# Patient Record
Sex: FEMALE | Race: WHITE | NOT HISPANIC OR LATINO | Employment: FULL TIME | URBAN - METROPOLITAN AREA
[De-identification: names, ages, dates, MRNs, and addresses within clinical notes are randomized per-mention and may not be internally consistent; named-entity substitution may affect disease eponyms.]

---

## 2017-01-03 DIAGNOSIS — Z94.4 LIVER REPLACED BY TRANSPLANT: Primary | ICD-10-CM

## 2017-01-03 LAB
EXT ALBUMIN: 3.6
EXT ALKALINE PHOSPHATASE: 200
EXT ALT: 101
EXT AST: 100
EXT BASOPHIL%: 0.3
EXT BILIRUBIN TOTAL: 0.5
EXT BUN: 18.71
EXT CALCIUM: 8.87
EXT CHLORIDE: 109
EXT CO2: 26
EXT CREATININE: 1.27 MG/DL
EXT EOSINOPHIL%: 3
EXT GFR MDRD NON AF AMER: 45
EXT GLUCOSE: 70
EXT HEMATOCRIT: 32.7
EXT HEMOGLOBIN: 10.7
EXT LYMPH%: 13.1
EXT MONOCYTES%: 14.1
EXT PLATELETS: 211
EXT POTASSIUM: 4.97
EXT PROTEIN TOTAL: 6.8
EXT SEGS%: 69.5
EXT SODIUM: 143 MMOL/L
EXT TACROLIMUS LVL: 5.9
EXT WBC: 3.8

## 2017-01-03 RX ORDER — TACROLIMUS 1 MG/1
2 CAPSULE ORAL EVERY 12 HOURS
Qty: 120 CAPSULE | Refills: 11 | Status: SHIPPED | OUTPATIENT
Start: 2017-01-03 | End: 2017-07-07 | Stop reason: SDUPTHER

## 2017-01-03 NOTE — TELEPHONE ENCOUNTER
Labs reviewed by Dr. Claros  Increase Prograf to 2 mg bid and repeat labs on 01/16/17  Called patient to let her know  Repeated and verbalized understanding.

## 2017-01-26 LAB
EXT ALBUMIN: 3.9
EXT ALKALINE PHOSPHATASE: 129
EXT ALT: 77
EXT AST: 81
EXT BASOPHIL%: 0.7
EXT BILIRUBIN TOTAL: 0.9
EXT BUN: 17.18
EXT CALCIUM: 8.41
EXT CHLORIDE: 108
EXT CO2: 24
EXT CREATININE: 1.32 MG/DL
EXT EOSINOPHIL%: 2.6
EXT GFR MDRD NON AF AMER: 43
EXT GLUCOSE: 75
EXT HEMATOCRIT: 35.4
EXT HEMOGLOBIN: 11.7
EXT LYMPH%: 11.1
EXT MONOCYTES%: 13.9
EXT PLATELETS: 203
EXT POTASSIUM: 3.95
EXT PROTEIN TOTAL: 7.2
EXT SEGS%: 71.7
EXT SODIUM: 142 MMOL/L
EXT TACROLIMUS LVL: 6.31
EXT WBC: 4.3

## 2017-01-31 ENCOUNTER — TELEPHONE (OUTPATIENT)
Dept: TRANSPLANT | Facility: CLINIC | Age: 52
End: 2017-01-31

## 2017-01-31 NOTE — TELEPHONE ENCOUNTER
----- Message from Tay Claros MD sent at 1/30/2017 10:59 AM CST -----  Results reviewed. Please advise labs are stable.

## 2017-01-31 NOTE — TELEPHONE ENCOUNTER
Labs reviewed by Dr. Claros  Continue routine labs no changes needed.  Letter sent for next lab appointment 04/17/17

## 2017-04-27 LAB
EXT ALBUMIN: 3.8
EXT ALKALINE PHOSPHATASE: 145
EXT ALT: 101
EXT AST: 95
EXT BASOPHIL%: 0.7
EXT BILIRUBIN TOTAL: 0.6
EXT BUN: 18.83
EXT CALCIUM: 8.58
EXT CHLORIDE: 110
EXT CO2: 25
EXT CREATININE: 1.25 MG/DL
EXT EOSINOPHIL%: 3
EXT GFR MDRD NON AF AMER: 46
EXT GLUCOSE: 82
EXT HEMATOCRIT: 32.7
EXT HEMOGLOBIN: 10.9
EXT LYMPH%: 13.7
EXT MONOCYTES%: 15.4
EXT PLATELETS: 207
EXT POTASSIUM: 4.74
EXT PROTEIN TOTAL: 7.2
EXT SEGS%: 67.2
EXT SODIUM: 143 MMOL/L
EXT TACROLIMUS LVL: 8.71
EXT WBC: 3.7

## 2017-04-28 ENCOUNTER — TELEPHONE (OUTPATIENT)
Dept: TRANSPLANT | Facility: CLINIC | Age: 52
End: 2017-04-28

## 2017-04-28 NOTE — TELEPHONE ENCOUNTER
Labs reviewed by Dr. Claros  Continue routine labs no changes needed.  Letter sent for next lab appointment 07/17/17

## 2017-04-28 NOTE — TELEPHONE ENCOUNTER
----- Message from Tay Claros MD sent at 4/28/2017  8:46 AM CDT -----  Results reviewed. Please advise labs are stable.

## 2017-05-02 RX ORDER — MYCOPHENOLIC ACID 180 MG/1
TABLET, DELAYED RELEASE ORAL
Qty: 120 TABLET | Refills: 11 | Status: SHIPPED | OUTPATIENT
Start: 2017-05-02 | End: 2018-04-03 | Stop reason: SDUPTHER

## 2017-06-14 ENCOUNTER — TELEPHONE (OUTPATIENT)
Dept: TRANSPLANT | Facility: CLINIC | Age: 52
End: 2017-06-14

## 2017-06-14 NOTE — TELEPHONE ENCOUNTER
----- Message from Laurie Boogie MA sent at 6/14/2017  3:56 PM CDT -----  Contact: pt  Pt would like to know do she need any scans prior to seeing the doctor  ----- Message -----  From: Liset Hackett  Sent: 6/14/2017   3:46 PM  To: [facility] Post-Liver Transplant Non-Clinical    Patient calling to reschedule her appts from June 6th patient requesting no mondays. Please call

## 2017-07-06 ENCOUNTER — OFFICE VISIT (OUTPATIENT)
Dept: TRANSPLANT | Facility: CLINIC | Age: 52
End: 2017-07-06
Payer: COMMERCIAL

## 2017-07-06 VITALS
HEART RATE: 73 BPM | OXYGEN SATURATION: 100 % | WEIGHT: 111.56 LBS | DIASTOLIC BLOOD PRESSURE: 93 MMHG | TEMPERATURE: 98 F | SYSTOLIC BLOOD PRESSURE: 157 MMHG | RESPIRATION RATE: 17 BRPM | HEIGHT: 60 IN | BODY MASS INDEX: 21.9 KG/M2

## 2017-07-06 DIAGNOSIS — Z29.89 PROPHYLACTIC IMMUNOTHERAPY: Chronic | ICD-10-CM

## 2017-07-06 DIAGNOSIS — Z94.4 LIVER TRANSPLANTED: Primary | ICD-10-CM

## 2017-07-06 DIAGNOSIS — Z85.05 HISTORY OF MALIGNANT HEPATOMA: Chronic | ICD-10-CM

## 2017-07-06 LAB
EXT ALBUMIN: 4.1
EXT ALKALINE PHOSPHATASE: 137
EXT ALT: 86
EXT AST: 83
EXT BASOPHIL%: 0.5
EXT BILIRUBIN TOTAL: 0.8
EXT BUN: 15.83
EXT CALCIUM: 9.11
EXT CHLORIDE: 106
EXT CO2: 28
EXT CREATININE: 1.47 MG/DL
EXT EOSINOPHIL%: 1.8
EXT GFR MDRD NON AF AMER: 38
EXT GLUCOSE: 78
EXT HEMATOCRIT: 35.7
EXT HEMOGLOBIN: 11.3
EXT LYMPH%: 9.8
EXT MONOCYTES%: 16.4
EXT PLATELETS: 216
EXT POTASSIUM: 4.61
EXT PROTEIN TOTAL: 7.4
EXT SEGS%: 71.2
EXT SODIUM: 143 MMOL/L
EXT TACROLIMUS LVL: 11.03
EXT WBC: 3.96

## 2017-07-06 PROCEDURE — 99999 PR PBB SHADOW E&M-EST. PATIENT-LVL III: CPT | Mod: PBBFAC,,, | Performed by: NURSE PRACTITIONER

## 2017-07-06 PROCEDURE — 99213 OFFICE O/P EST LOW 20 MIN: CPT | Mod: S$GLB,,, | Performed by: NURSE PRACTITIONER

## 2017-07-06 NOTE — PROGRESS NOTES
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    ORGAN: LIVER  Whole or Partial: whole liver  Donor Type:  - brain death  CDC High Risk: no  Donor CMV Status: positive  Donor HCV Status: negative  Donor HBcAb: negative  Biliary Anastomosis:   Arterial Anatomy:   IVC reconstruction:   Portal vein status:     She has had the following complications since transplant: de rajesh autoimmune hepatitis. The noted complications is well controlled.    Subjective:     Interval History: Ngoc was last seen on 16. Currently, she is doing well. Current complaints include none.    Elevated LFTs are stable, recent liver biopsy on 16 reviewed in path and found to have minimal rejection  Maintained on tacro + myfortic  prograf trough 11 with increased creatinine of 1.4 from a baseline of 1.2.     No c/o jaundice, dark urine, abdominal distension, edema    Additional hx of Raynauds, RLQ bulge, found to be a seroma,  intermittently tender.     Liver biopsy 16  Not diagnostic of acute cellular rejection.  - Mild sinusoidal dilatation.  - No significant steatosis.  - No significant fibrosis seen on routine H&E sections.  - Results of iron stain and trichrome to be issued in an addendum.  - No significant plasma cell population.    Review of Systems   Constitutional: Negative for activity change, appetite change, chills, diaphoresis, fatigue, fever and unexpected weight change.   HENT: Negative for facial swelling and nosebleeds.    Respiratory: Negative for cough, chest tightness and shortness of breath.    Cardiovascular: Negative for chest pain, palpitations and leg swelling.   Gastrointestinal: Negative for abdominal distention, abdominal pain, blood in stool, constipation, diarrhea, nausea and vomiting.    Musculoskeletal: Negative for neck pain and neck stiffness.   Skin: Negative for color change, pallor and rash.   Neurological: Negative for dizziness, tremors, syncope, weakness, light-headedness and headaches.   Hematological: Negative for adenopathy. Does not bruise/bleed easily.   Psychiatric/Behavioral: Negative for agitation, behavioral problems, confusion and decreased concentration.       Objective:     Physical Exam   Constitutional: She is oriented to person, place, and time. She appears well-developed and well-nourished. No distress.   HENT:   Head: Normocephalic and atraumatic.   Mouth/Throat: No oropharyngeal exudate.   Eyes: Conjunctivae are normal. No scleral icterus.   Neck: Normal range of motion. Neck supple.   Cardiovascular: Normal rate.    No murmur heard.  Pulmonary/Chest: Effort normal.   Abdominal: Soft. She exhibits no distension.   Musculoskeletal: Normal range of motion.   Neurological: She is alert and oriented to person, place, and time. Coordination normal.   Skin: Skin is dry. No rash noted. She is not diaphoretic. No erythema. No pallor.        Surgical incision healed well, no hernia appreciated   Psychiatric: She has a normal mood and affect. Her behavior is normal. Judgment and thought content normal.     Lab Results   Component Value Date    BILITOT 0.8 11/24/2015     (H) 11/24/2015    ALT 79 (H) 11/24/2015    ALKPHOS 236 (H) 11/24/2015    CREATININE 1.2 11/24/2015    ALBUMIN 4.0 11/24/2015     Lab Results   Component Value Date    WBC 4.50 11/23/2016    HGB 12.2 11/23/2016    HCT 38.4 11/23/2016     11/23/2016     Lab Results   Component Value Date    TACROLIMUS 7.3 03/25/2013       Assessment/Plan:     1. Liver transplanted    2. Prophylactic immunotherapy    3. History of malignant hepatoma        HCC surveillance: per protocol  S/P liver transplant due to cryptogenic cirrhosis : LFTs elevated but relatively stable. Continue monitoring symptoms, labs and drug  levels for drug-related toxicity and side effects  -continue with post transplant labs per protocol  IS: decrease tacro to 2/1, repeat labs in 1-2 weeks.   Maintenance:  -Instructed to f/u with PCP for regular health maintenance care including cancer screenings and BMD  -Reviewed need to avoid sun exposure with use of sunblock, hats, long sleeves related to increased risk of skin cancers  -Recommend f/u with Dermatology for annual skin checks    RTC 1 year    Florecita Anderson NP           UNOS Patient Status  Functional Status: 100% - Normal, no complaints, no evidence of disease  Physical Capacity: No Limitations

## 2017-07-06 NOTE — LETTER
July 6, 2017        Deandre Morton Jr.  [address]  [place] [address]  Phone: [phone]  Fax: [phone]             [facility] Liver Transplant  [address]  [facility] [address]  Phone: [phone]   Patient: Ngoc Cruz   MR Number: [id number]   YOB: 1965   Date of Visit: 7/6/2017       Dear Dr. Deandre Morton Jr.    Thank you for referring Ngoc Cruz to me for evaluation. Attached you will find relevant portions of my assessment and plan of care.    If you have questions, please do not hesitate to call me. I look forward to following Ngoc Cruz along with you.    Sincerely,    Florecita Anderson, JUDY    Enclosure    If you would like to receive this communication electronically, please contact [email or web address] or [phone] to request [facility] [employer] access.    [facility] is a tool which provides read-only access to select patient information with whom you have a relationship. Its easy to use and provides real time access to review your patients record including encounter summaries, notes, results, and demographic information.    If you feel you have received this communication in error or would no longer like to receive these types of communications, please e-mail [email or web address]

## 2017-07-06 NOTE — Clinical Note
Instructed to decrease tacro to 2/1, repeat labs in 1-2 weeks rtc 1 year with Dr. Claros, if not available ok to see me

## 2017-07-07 DIAGNOSIS — Z94.4 LIVER REPLACED BY TRANSPLANT: ICD-10-CM

## 2017-07-07 RX ORDER — TACROLIMUS 1 MG/1
CAPSULE ORAL
Qty: 90 CAPSULE | Refills: 11 | Status: SHIPPED | OUTPATIENT
Start: 2017-07-07 | End: 2018-04-03 | Stop reason: SDUPTHER

## 2017-07-07 NOTE — TELEPHONE ENCOUNTER
----- Message from Florecita Anderson NP sent at 7/6/2017  3:26 PM CDT -----  Instructed to decrease tacro to 2/1, repeat labs in 1-2 weeks  rtc 1 year with Dr. Claros, if not available ok to see me

## 2017-07-26 ENCOUNTER — TELEPHONE (OUTPATIENT)
Dept: TRANSPLANT | Facility: CLINIC | Age: 52
End: 2017-07-26

## 2017-07-26 LAB
EXT ALBUMIN: 4
EXT ALKALINE PHOSPHATASE: 137
EXT ALT: 76
EXT AST: 82
EXT BASOPHIL%: 0.2
EXT BILIRUBIN TOTAL: 0.7
EXT BUN: 22.18
EXT CALCIUM: 8.41
EXT CHLORIDE: 109
EXT CO2: 24
EXT CREATININE: 1.23 MG/DL
EXT EOSINOPHIL%: 1.7
EXT GFR MDRD NON AF AMER: 46
EXT GLUCOSE: 70
EXT HEMATOCRIT: 34.5
EXT HEMOGLOBIN: 11.1
EXT LYMPH%: 9.6
EXT MONOCYTES%: 10.7
EXT PLATELETS: 219
EXT POTASSIUM: 4.3
EXT PROTEIN TOTAL: 7.3
EXT SEGS%: 77.4
EXT SODIUM: 142 MMOL/L
EXT TACROLIMUS LVL: 7.09
EXT WBC: 5.43

## 2017-07-26 NOTE — TELEPHONE ENCOUNTER
Labs reviewed by Dr. Claros  Continue routine labs no changes needed.  Letter sent for next lab appointment 09/18/17

## 2017-07-26 NOTE — TELEPHONE ENCOUNTER
----- Message from Tay Claros MD sent at 7/26/2017  9:07 AM CDT -----  Results reviewed. Please advise labs are stable.

## 2017-09-20 NOTE — TELEPHONE ENCOUNTER
----- Message from Gita Pang sent at 9/20/2017  9:55 AM CDT -----  Contact: pt  Need refills for ranitidine (ZANTAC) 300 MG tablet called in please can call her if needed @ # [phone].

## 2017-09-20 NOTE — TELEPHONE ENCOUNTER
Returned call will need to get knew lab, since her current lab does not take her insurance anymore.  Will send over a new script for Zantac.

## 2017-09-28 ENCOUNTER — TELEPHONE (OUTPATIENT)
Dept: TRANSPLANT | Facility: CLINIC | Age: 52
End: 2017-09-28

## 2017-09-28 NOTE — TELEPHONE ENCOUNTER
----- Message from Smita Mcdaniel sent at 9/28/2017  9:39 AM CDT -----  Contact: Pt  Indigo,    Pt states lab orders can be sent to Dr.James Morton's office and his nurse will get her set up at [facility] Fax# [phone]    Pt contact number [phone]  Thanks

## 2017-10-04 ENCOUNTER — TELEPHONE (OUTPATIENT)
Dept: TRANSPLANT | Facility: CLINIC | Age: 52
End: 2017-10-04

## 2017-10-17 ENCOUNTER — TELEPHONE (OUTPATIENT)
Dept: TRANSPLANT | Facility: CLINIC | Age: 52
End: 2017-10-17

## 2017-10-17 NOTE — TELEPHONE ENCOUNTER
----- Message from Gita Pang sent at 10/17/2017 11:20 AM CDT -----  Contact: pt  Did her labs this morning at [facility] and lab will send results please call @ # [phone].

## 2017-10-19 LAB
ALBUMIN SERPL-MCNC: 4.6 G/DL (ref 3.5–5.5)
ALBUMIN/GLOB SERPL: 1.7 {RATIO} (ref 1.2–2.2)
ALP SERPL-CCNC: 142 IU/L (ref 39–117)
ALT SERPL-CCNC: 65 IU/L (ref 0–32)
AST SERPL-CCNC: 69 IU/L (ref 0–40)
BASOPHILS # BLD AUTO: 0 X10E3/UL (ref 0–0.2)
BASOPHILS NFR BLD AUTO: 1 %
BILIRUB SERPL-MCNC: 0.6 MG/DL (ref 0–1.2)
BUN SERPL-MCNC: 18 MG/DL (ref 6–24)
BUN/CREAT SERPL: 13 (ref 9–23)
CALCIUM SERPL-MCNC: 8.6 MG/DL (ref 8.7–10.2)
CHLORIDE SERPL-SCNC: 105 MMOL/L (ref 96–106)
CO2 SERPL-SCNC: 21 MMOL/L (ref 18–29)
CREAT SERPL-MCNC: 1.37 MG/DL (ref 0.57–1)
EOSINOPHIL # BLD AUTO: 0.1 X10E3/UL (ref 0–0.4)
EOSINOPHIL NFR BLD AUTO: 2 %
ERYTHROCYTE [DISTWIDTH] IN BLOOD BY AUTOMATED COUNT: 13.8 % (ref 12.3–15.4)
EXT ALBUMIN: 4.6
EXT ALKALINE PHOSPHATASE: 142
EXT ALT: 65
EXT AST: 69
EXT BASOPHIL%: 1
EXT BILIRUBIN TOTAL: 0.6
EXT BUN: 18
EXT CALCIUM: 8.6
EXT CHLORIDE: 105
EXT CO2: 21
EXT CREATININE: 1.37 MG/DL
EXT EOSINOPHIL%: 2
EXT GFR MDRD NON AF AMER: 45
EXT GLUCOSE: 86
EXT HEMATOCRIT: 34.5
EXT HEMOGLOBIN: 10.8
EXT LYMPH%: 12
EXT MONOCYTES%: 9
EXT PLATELETS: 203
EXT POTASSIUM: 4.4
EXT PROTEIN TOTAL: 7.3
EXT SEGS%: 76
EXT SODIUM: 145 MMOL/L
EXT TACROLIMUS LVL: 6.7
EXT WBC: 3.5
GFR SERPLBLD CREATININE-BSD FMLA CKD-EPI: 45 ML/MIN/1.73
GFR SERPLBLD CREATININE-BSD FMLA CKD-EPI: 52 ML/MIN/1.73
GLOBULIN SER CALC-MCNC: 2.7 G/DL (ref 1.5–4.5)
GLUCOSE SERPL-MCNC: 86 MG/DL (ref 65–99)
HCT VFR BLD AUTO: 34.5 % (ref 34–46.6)
HGB BLD-MCNC: 10.8 G/DL (ref 11.1–15.9)
LYMPHOCYTES # BLD AUTO: 0.4 X10E3/UL (ref 0.7–3.1)
LYMPHOCYTES NFR BLD AUTO: 12 %
MCH RBC QN AUTO: 28.4 PG (ref 26.6–33)
MCHC RBC AUTO-ENTMCNC: 31.2 G/DL (ref 31.5–35.7)
MCV RBC AUTO: 91 FL (ref 79–97)
MONOCYTES # BLD AUTO: 0.3 X10E3/UL (ref 0.1–0.9)
MONOCYTES NFR BLD AUTO: 9 %
NEUTROPHILS # BLD AUTO: 2.6 X10E3/UL (ref 1.4–7)
NEUTROPHILS NFR BLD AUTO: 76 %
PLATELET # BLD AUTO: 203 X10E3/UL (ref 150–379)
POTASSIUM SERPL-SCNC: 4.4 MMOL/L (ref 3.5–5.2)
PROT SERPL-MCNC: 7.3 G/DL (ref 6–8.5)
RBC # BLD AUTO: 3.79 X10E6/UL (ref 3.77–5.28)
SODIUM SERPL-SCNC: 145 MMOL/L (ref 134–144)
TACROLIMUS BLD-MCNC: 6.7 NG/ML (ref 2–20)
WBC # BLD AUTO: 3.5 X10E3/UL (ref 3.4–10.8)

## 2017-10-20 ENCOUNTER — TELEPHONE (OUTPATIENT)
Dept: TRANSPLANT | Facility: CLINIC | Age: 52
End: 2017-10-20

## 2017-10-20 NOTE — TELEPHONE ENCOUNTER
Labs reviewed by Dr. Claros  Continue routine labs no changes needed.  Letter sent for next lab appointment 01/08/18

## 2017-10-20 NOTE — TELEPHONE ENCOUNTER
----- Message from Tay Claros MD sent at 10/19/2017  4:04 PM CDT -----  Results reviewed. Please advise labs are stable.

## 2018-01-03 ENCOUNTER — TELEPHONE (OUTPATIENT)
Dept: TRANSPLANT | Facility: CLINIC | Age: 53
End: 2018-01-03

## 2018-01-03 LAB
EXT ALBUMIN: 3.8
EXT ALKALINE PHOSPHATASE: 138
EXT ALT: 88
EXT AST: 91
EXT BASOPHIL%: 0.5
EXT BILIRUBIN TOTAL: 0.8
EXT BUN: 20.39
EXT CALCIUM: 7.92
EXT CHLORIDE: 106
EXT CO2: 27
EXT CREATININE: 1.06 MG/DL
EXT EOSINOPHIL%: 3.1
EXT GFR MDRD NON AF AMER: 55
EXT GLUCOSE: 78
EXT HEMATOCRIT: 36.9
EXT HEMOGLOBIN: 11.4
EXT LYMPH%: 13
EXT MONOCYTES%: 13.5
EXT PLATELETS: 225
EXT POTASSIUM: 4.41
EXT PROTEIN TOTAL: 7.5
EXT SEGS%: 69.6
EXT SODIUM: 142 MMOL/L
EXT TACROLIMUS LVL: 5.6
EXT WBC: 3.85

## 2018-01-03 NOTE — TELEPHONE ENCOUNTER
Labs reviewed by Dr. Claros  Continue routine labs no changes needed.  Letter sent for next lab appointment 03/19/18

## 2018-01-03 NOTE — TELEPHONE ENCOUNTER
----- Message from Tay Claros MD sent at 1/3/2018  2:32 PM CST -----  Results reviewed. Please advise labs are stable.

## 2018-01-11 ENCOUNTER — TELEPHONE (OUTPATIENT)
Dept: TRANSPLANT | Facility: CLINIC | Age: 53
End: 2018-01-11

## 2018-01-11 NOTE — TELEPHONE ENCOUNTER
Returned call would like information if there is any assistance for the myfortic.  I informed patient I would send a message to Barbra Boogie to have her call patient.

## 2018-01-11 NOTE — TELEPHONE ENCOUNTER
----- Message from Kamlesh Wan sent at 1/11/2018 10:19 AM CST -----  Contact: patient   Patient would like a call to talk about her medication  MYFORTIC 180 mg TbEC        Please call [phone]        Thanks

## 2018-01-19 ENCOUNTER — DOCUMENTATION ONLY (OUTPATIENT)
Dept: TRANSPLANT | Facility: CLINIC | Age: 53
End: 2018-01-19

## 2018-01-19 ENCOUNTER — TELEPHONE (OUTPATIENT)
Dept: TRANSPLANT | Facility: CLINIC | Age: 53
End: 2018-01-19

## 2018-01-19 ENCOUNTER — TELEPHONE (OUTPATIENT)
Dept: TRANSPLANT | Facility: HOSPITAL | Age: 53
End: 2018-01-19

## 2018-01-19 NOTE — TELEPHONE ENCOUNTER
----- Message from Matiperry Boogie sent at 1/19/2018 12:03 PM CST -----  Regarding: Ngoc Mendesbere WHITING... Sw please copy note into patient chart, as I do not have permission to do so.    Indigo,    The patient has a $2700 deductible to meet before all generic meds are covered at 100%. I told her to call her insurance company and ask the following questions to possibly reduce Rx costs:  1. Is there an option for tier exception for transplant meds (can myfortic can be placed on lower tier?)  2. Is current pharmacy out of network? (Pt currently using [facility])  3. Is there a preferred retail pharmacy or mail order pharmacy that the company recommends (meds are usually cheaper)  4. How much more do I have to spend OOP to meet the deductible amount?    I am preparing to send the patient a PAP and application for Myfortic, but this does not mean she will qualify for assistance. Most likely she will not qualify but we will send the application anyway.    Noman  ----- Message -----  From: Indigo Pastor RN  Sent: 1/11/2018  10:45 AM  To: Noman Boogie    Patient said her Myfortic is getting too expensive.  Can you please call her and see if she qualifies for any assistance?  If not I will have to speak to Dr Claros.    Thanks

## 2018-01-19 NOTE — PROGRESS NOTES
Update about PA    Noman Boogie  Indigo Pastor RN   Cc: Silvia Thomas; Melissa Brown; Pilar Dockery, [facility]; Claudette Reilly, MILO             FYI... Sw please copy note into patient chart, as I do not have permission to do so.     Indigo,     The patient has a $2700 deductible to meet before all generic meds are covered at 100%. I told her to call her insurance company and ask the following questions to possibly reduce Rx costs:   1. Is there an option for tier exception for transplant meds (can myfortic can be placed on lower tier?)   2. Is current pharmacy out of network? (Pt currently using [facility])   3. Is there a preferred retail pharmacy or mail order pharmacy that the company recommends (meds are usually cheaper)   4. How much more do I have to spend OOP to meet the deductible amount?     I am preparing to send the patient a PAP and application for Myfortic, but this does not mean she will qualify for assistance. Most likely she will not qualify but we will send the application anyway.     Noman    Previous Messages      ----- Message -----   From: Indigo Pastor RN   Sent: 1/11/2018  10:45 AM   To: Noman Boogie     Patient said her Myfortic is getting too expensive.  Can you please call her and see if she qualifies for any assistance?  If not I will have to speak to Dr Claros.     Thanks

## 2018-01-19 NOTE — TELEPHONE ENCOUNTER
AUSTIN Update:    Please see message below regarding concerns about pt affording copays and assistance provided by ZACK Boogie. PAP Application being mailed out to pt by PAP coord and instructions will be given to pt by RN Coord on what pt should be asking her insurance company.     AUSTIN remains available.     ----- Message from Noman Boogie sent at 1/19/2018 12:03 PM CST -----  Regarding: Ngoc WHITING... Austin please copy note into patient chart, as I do not have permission to do so.    Indigo,    The patient has a $2700 deductible to meet before all generic meds are covered at 100%. I told her to call her insurance company and ask the following questions to possibly reduce Rx costs:  1. Is there an option for tier exception for transplant meds (can myfortic can be placed on lower tier?)  2. Is current pharmacy out of network? (Pt currently using [facility])  3. Is there a preferred retail pharmacy or mail order pharmacy that the company recommends (meds are usually cheaper)  4. How much more do I have to spend OOP to meet the deductible amount?    I am preparing to send the patient a PAP and application for Myfortic, but this does not mean she will qualify for assistance. Most likely she will not qualify but we will send the application anyway.    Noman  ----- Message -----  From: Indigo Pastor RN  Sent: 1/11/2018  10:45 AM  To: Matiperry Keagan    Patient said her Myfortic is getting too expensive.  Can you please call her and see if she qualifies for any assistance?  If not I will have to speak to Dr Claros.    Thanks

## 2018-02-06 ENCOUNTER — TELEPHONE (OUTPATIENT)
Dept: TRANSPLANT | Facility: CLINIC | Age: 53
End: 2018-02-06

## 2018-02-06 NOTE — TELEPHONE ENCOUNTER
----- Message from Jose Encarnacion sent at 2/6/2018  2:53 PM CST -----  Contact: Pt  Pt calling to speak with Indigo in regards to a prior authorization for MYFORTIC 180 mg TbEC and pt would also like to check on the prograf as well.      Call back number: [phone]

## 2018-02-14 ENCOUNTER — TELEPHONE (OUTPATIENT)
Dept: TRANSPLANT | Facility: CLINIC | Age: 53
End: 2018-02-14

## 2018-02-14 NOTE — TELEPHONE ENCOUNTER
Called to get formulary exception from patient's insurance company.  Ref# [id number].  Spoke to Sohail at [facility] patient will get assistance for now.

## 2018-03-29 LAB
EXT ALBUMIN: 4
EXT ALKALINE PHOSPHATASE: 186
EXT ALT: 126
EXT AST: 125
EXT BASOPHIL%: 0.7
EXT BILIRUBIN TOTAL: 0.8
EXT BUN: 19.31
EXT CALCIUM: 8.74
EXT CHLORIDE: 108
EXT CO2: 25
EXT CREATININE: 1.19 MG/DL
EXT EOSINOPHIL%: 3.3
EXT GFR MDRD NON AF AMER: 48
EXT GLUCOSE: 86
EXT HEMATOCRIT: 35.6
EXT HEMOGLOBIN: 11.3
EXT LYMPH%: 9.9
EXT MONOCYTES%: 15.1
EXT PLATELETS: 211
EXT POTASSIUM: 4.34
EXT PROTEIN TOTAL: 7.2
EXT SEGS%: 71
EXT SODIUM: 144 MMOL/L
EXT TACROLIMUS LVL: 5.6
EXT WBC: 4.23

## 2018-04-03 DIAGNOSIS — Z94.4 LIVER REPLACED BY TRANSPLANT: ICD-10-CM

## 2018-04-03 RX ORDER — TACROLIMUS 1 MG/1
2 CAPSULE ORAL EVERY 12 HOURS
Qty: 120 CAPSULE | Refills: 11 | Status: SHIPPED | OUTPATIENT
Start: 2018-04-03 | End: 2018-05-05 | Stop reason: SDUPTHER

## 2018-04-03 NOTE — TELEPHONE ENCOUNTER
Labs reviewed by Dr. Claros  Called patient to let her know that her enzymes are elevated more, he would like her to increase Prograf dose to 2 mg bid.  She will also need an US and biopsy.  Patient would like to repeat labs first before having the biopsy.  I advised I will send a message to the doctor to see if that is ok.  As of now she will increase Prograf and do labs on Tuesday.

## 2018-04-03 NOTE — TELEPHONE ENCOUNTER
----- Message from Tay Claros MD sent at 4/2/2018  8:44 AM CDT -----  Needs u/s doppler and a biopsy. Increase tac to 2/2.

## 2018-04-04 RX ORDER — MYCOPHENOLIC ACID 180 MG/1
TABLET, DELAYED RELEASE ORAL
Qty: 120 TABLET | Refills: 6 | Status: SHIPPED | OUTPATIENT
Start: 2018-04-04 | End: 2018-09-25 | Stop reason: SDUPTHER

## 2018-04-05 ENCOUNTER — TELEPHONE (OUTPATIENT)
Dept: TRANSPLANT | Facility: CLINIC | Age: 53
End: 2018-04-05

## 2018-04-05 NOTE — TELEPHONE ENCOUNTER
----- Message from Tay Claros MD sent at 4/4/2018  9:54 AM CDT -----  ok  ----- Message -----  From: Indigo Pastor RN  Sent: 4/3/2018  12:03 PM  To: Tay Claros MD    Patient would like to make her medication change and repeat labs next week before proceeding with the biopsy, if that is ok?

## 2018-04-12 LAB
EXT ALBUMIN: 4.1
EXT ALKALINE PHOSPHATASE: 178
EXT ALT: 125
EXT AST: 126
EXT BASOPHIL%: 0.5
EXT BILIRUBIN TOTAL: 0.8
EXT BUN: 19.98
EXT CALCIUM: 8.71
EXT CHLORIDE: 109
EXT CO2: 26
EXT CREATININE: 1.28 MG/DL
EXT EOSINOPHIL%: 1.7
EXT GFR MDRD NON AF AMER: 44
EXT GLUCOSE: 77
EXT HEMATOCRIT: 37.2
EXT HEMOGLOBIN: 11.9
EXT LYMPH%: 6.5
EXT MONOCYTES%: 11
EXT PLATELETS: 242
EXT POTASSIUM: 4.32
EXT PROTEIN TOTAL: 7.3
EXT SEGS%: 80.1
EXT SODIUM: 143 MMOL/L
EXT TACROLIMUS LVL: 9.78
EXT WBC: 6.46

## 2018-04-13 ENCOUNTER — TELEPHONE (OUTPATIENT)
Dept: TRANSPLANT | Facility: CLINIC | Age: 53
End: 2018-04-13

## 2018-04-13 DIAGNOSIS — R74.8 ELEVATED LIVER ENZYMES: Primary | ICD-10-CM

## 2018-04-13 NOTE — TELEPHONE ENCOUNTER
Labs reviewed by Dr. Claros  Called patient to let her know that her labs are the same still elevated and the doctor would like to proceed with the biopsy.  She will stop her aspirin starting tomorrow.  Patient also requested to have a scan of her abdomen because she has a protrusion in her lower left quadrant.  I will send message to doctor Claros to see what would be appropriate.

## 2018-04-13 NOTE — TELEPHONE ENCOUNTER
----- Message from Tay Claros MD sent at 4/12/2018  6:18 PM CDT -----  Would proceed for liver biopsy

## 2018-04-18 DIAGNOSIS — Z94.4 LIVER REPLACED BY TRANSPLANT: Primary | ICD-10-CM

## 2018-04-20 ENCOUNTER — HOSPITAL ENCOUNTER (OUTPATIENT)
Facility: HOSPITAL | Age: 53
Discharge: HOME | End: 2018-04-20
Attending: INTERNAL MEDICINE | Admitting: INTERNAL MEDICINE
Payer: COMMERCIAL

## 2018-04-20 ENCOUNTER — HOSPITAL ENCOUNTER (OUTPATIENT)
Dept: RADIOLOGY | Facility: HOSPITAL | Age: 53
Discharge: HOME | End: 2018-04-20
Attending: INTERNAL MEDICINE
Payer: COMMERCIAL

## 2018-04-20 VITALS
RESPIRATION RATE: 16 BRPM | TEMPERATURE: 98 F | SYSTOLIC BLOOD PRESSURE: 135 MMHG | HEIGHT: 60 IN | HEART RATE: 81 BPM | DIASTOLIC BLOOD PRESSURE: 87 MMHG | OXYGEN SATURATION: 99 % | BODY MASS INDEX: 23.36 KG/M2 | WEIGHT: 119 LBS

## 2018-04-20 DIAGNOSIS — R74.8 ELEVATED LIVER ENZYMES: ICD-10-CM

## 2018-04-20 DIAGNOSIS — Z94.4 LIVER TRANSPLANTED: ICD-10-CM

## 2018-04-20 PROCEDURE — 93975 VASCULAR STUDY: CPT | Mod: 26,,, | Performed by: RADIOLOGY

## 2018-04-20 PROCEDURE — 88307 TISSUE EXAM BY PATHOLOGIST: CPT | Mod: 26,,, | Performed by: PATHOLOGY

## 2018-04-20 PROCEDURE — 63600175 PHARM REV CODE 636 W HCPCS: Performed by: RADIOLOGY

## 2018-04-20 PROCEDURE — 76705 ECHO EXAM OF ABDOMEN: CPT | Mod: 26,59,, | Performed by: RADIOLOGY

## 2018-04-20 PROCEDURE — 76705 ECHO EXAM OF ABDOMEN: CPT | Mod: TC

## 2018-04-20 PROCEDURE — 93975 VASCULAR STUDY: CPT | Mod: TC

## 2018-04-20 PROCEDURE — 88307 TISSUE EXAM BY PATHOLOGIST: CPT | Performed by: PATHOLOGY

## 2018-04-20 PROCEDURE — 25000003 PHARM REV CODE 250: Performed by: INTERNAL MEDICINE

## 2018-04-20 PROCEDURE — 88313 SPECIAL STAINS GROUP 2: CPT | Mod: 26,,, | Performed by: PATHOLOGY

## 2018-04-20 RX ORDER — FENTANYL CITRATE 50 UG/ML
INJECTION, SOLUTION INTRAMUSCULAR; INTRAVENOUS CODE/TRAUMA/SEDATION MEDICATION
Status: COMPLETED | OUTPATIENT
Start: 2018-04-20 | End: 2018-04-20

## 2018-04-20 RX ORDER — SODIUM CHLORIDE 9 MG/ML
INJECTION, SOLUTION INTRAVENOUS CONTINUOUS
Status: DISCONTINUED | OUTPATIENT
Start: 2018-04-20 | End: 2018-04-20 | Stop reason: HOSPADM

## 2018-04-20 RX ORDER — LIDOCAINE HYDROCHLORIDE 10 MG/ML
1 INJECTION, SOLUTION EPIDURAL; INFILTRATION; INTRACAUDAL; PERINEURAL ONCE
Status: COMPLETED | OUTPATIENT
Start: 2018-04-20 | End: 2018-04-20

## 2018-04-20 RX ORDER — HYDRALAZINE HYDROCHLORIDE 20 MG/ML
INJECTION INTRAMUSCULAR; INTRAVENOUS CODE/TRAUMA/SEDATION MEDICATION
Status: COMPLETED | OUTPATIENT
Start: 2018-04-20 | End: 2018-04-20

## 2018-04-20 RX ORDER — MIDAZOLAM HYDROCHLORIDE 1 MG/ML
INJECTION INTRAMUSCULAR; INTRAVENOUS CODE/TRAUMA/SEDATION MEDICATION
Status: COMPLETED | OUTPATIENT
Start: 2018-04-20 | End: 2018-04-20

## 2018-04-20 RX ADMIN — SODIUM CHLORIDE: 9 INJECTION, SOLUTION INTRAVENOUS at 01:04

## 2018-04-20 RX ADMIN — FENTANYL CITRATE 50 MCG: 50 INJECTION, SOLUTION INTRAMUSCULAR; INTRAVENOUS at 03:04

## 2018-04-20 RX ADMIN — HYDRALAZINE HYDROCHLORIDE 10 MG: 20 INJECTION INTRAMUSCULAR; INTRAVENOUS at 02:04

## 2018-04-20 RX ADMIN — LIDOCAINE HYDROCHLORIDE 1 MG: 10 INJECTION, SOLUTION EPIDURAL; INFILTRATION; INTRACAUDAL; PERINEURAL at 01:04

## 2018-04-20 RX ADMIN — MIDAZOLAM HYDROCHLORIDE 1 MG: 1 INJECTION, SOLUTION INTRAMUSCULAR; INTRAVENOUS at 03:04

## 2018-04-20 NOTE — DISCHARGE SUMMARY
Radiology Discharge Summary      Hospital Course: No complications    Admit Date: 4/20/2018  Discharge Date: 04/20/2018     Instructions Given to Patient: Yes  Diet: regular diet and Resume prior diet  Activity: activity as tolerated    Description of Condition on Discharge: Stable  Vital Signs (Most Recent): Temp: 97.9 °F (36.6 °C) (04/20/18 1315)  Pulse: 109 (04/20/18 1536)  Resp: 16 (04/20/18 1536)  BP: (!) 176/80 (04/20/18 1536)  SpO2: 99 % (04/20/18 1536)    Discharge Disposition: Home    Discharge Diagnosis: liver transplant with elevated LFTS    The patient is to follow up with the ordering physician for results and further management. Please feel free to contact me or the radiology department with any questions or concerns.    REZA JUAREZ  PGY-III Radiology Resident  [address] [facility]  Pager: [phone]

## 2018-04-20 NOTE — DISCHARGE INSTRUCTIONS
For questions or concerns call: JUSTINE MON-FRI 8 AM- 5PM [phone]. Radiology resident on call [phone].    For immediate concerns that are not emergent, you may call our radiology clinic at: [phone]

## 2018-04-20 NOTE — PROCEDURES
Radiology Post-Procedure Note    Pre Op Diagnosis: Liver transplant    Post Op Diagnosis: Same    Procedure: Ultrasound guided liver biopsy    Procedure performed by: Cam JENKINS    Written Informed Consent Obtained: Yes    Specimen Removed: Yes: Single 16 gauge core biopsy of liver    Estimated Blood Loss: Minimal    Findings: Moderate sedation and local anesthesia were used.    A 16-gauge Monopty biopsy device was used to remove 1 random right hepatic lobe specimen.  This specimen was sent to pathology for further evaluation.      The patient tolerated the procedure well and there were no complications.  Please see Imaging report for further details.      REZA JUAREZ  PGY-III Radiology Resident  [address] [facility]  Pager: [phone]

## 2018-04-20 NOTE — H&P
Radiology History & Physical      SUBJECTIVE:     Chief Complaint: liver transplant    History of Present Illness:  Ngoc Cruz is a 52 y.o. female who presents for random liver biopsy.    Past Medical History:   Diagnosis Date    Alpha-1-antitrypsin deficiency     Autoimmune hepatitis     Cancer, hepatocellular     Liver transplanted     Mitral valve prolapse      Past Surgical History:   Procedure Laterality Date    ENDOMETRIAL ABLATION      LIVER TRANSPLANT         Home Meds:   Prior to Admission medications    Medication Sig Start Date End Date Taking? Authorizing Provider   acyclovir (ZOVIRAX) 400 MG tablet Take 400 mg by mouth once daily. 4/30/16  Yes Historical Provider, MD   aspirin (ASPIRIN LOW DOSE) 81 MG EC tablet Take 1 tablet by mouth. 6/15/12  Yes Historical Provider, MD   MYFORTIC 180 mg TbEC TAKE (2) TABLETS TWICE DAILY  Patient taking differently: take 1 tablet 4 times daily 4/4/18  Yes Tay Claros MD   ranitidine (ZANTAC) 300 MG tablet Take 1 tablet (300 mg total) by mouth nightly. 9/20/17  Yes Tay Claros MD   tacrolimus (PROGRAF) 1 MG Cap Take 2 capsules (2 mg total) by mouth every 12 (twelve) hours. 4/3/18  Yes Tay Claros MD   calcium carbonate 220 mg capsule Take 250 mg by mouth once daily.     Historical Provider, MD   loperamide (IMODIUM A-D) 2 mg Tab Take 2 mg by mouth 4 (four) times daily as needed.    Historical Provider, MD   MULTIVITAMIN ORAL Take 1 tablet by mouth. 6/15/12   Historical Provider, MD   ascorbic acid (VITAMIN C) 1000 MG tablet Take 1,000 mg by mouth once daily.  4/20/18  Historical Provider, MD   OXYBUTYNIN (OXYTROL FOR WOMEN TD) Place onto the skin.  4/20/18  Historical Provider, MD   oxybutynin 3.9 MG/24 HR TD PTSW (OXYTROL) 3.9 mg/24 hr PTSW Place 1 patch onto the skin twice a week.  4/20/18  Historical Provider, MD   valacyclovir (VALTREX) 500 MG tablet Take 500 mg by mouth once daily.  4/20/18  Historical Provider, MD      Anticoagulants/Antiplatelets: aspirin stopped 6 days ago    Allergies:   Review of patient's allergies indicates:   Allergen Reactions    Adhesive Rash    Latex, natural rubber Rash     Sensitivity to latex products     Sedation History:  no adverse reactions    Review of Systems:   Hematological: no known coagulopathies  Respiratory: no shortness of breath  Cardiovascular: no chest pain  Gastrointestinal: no abdominal pain  Genito-Urinary: no dysuria  Musculoskeletal: negative  Neurological: no TIA or stroke symptoms         OBJECTIVE:     Vital Signs (Most Recent)  Temp: 97.9 °F (36.6 °C) (04/20/18 1315)  Pulse: 61 (61) (04/20/18 1315)  Resp: 16 (04/20/18 1315)  BP: (!) 163/85 (04/20/18 1320)  SpO2: 100 % (04/20/18 1315)    Physical Exam:  ASA: 3  Mallampati: 2    General: no acute distress  Mental Status: alert and oriented to person, place and time  HEENT: normocephalic, atraumatic  Chest: unlabored breathing  Heart: regular heart rate  Abdomen: nondistended  Extremity: moves all extremities    Laboratory  Lab Results   Component Value Date    INR 1.0 04/20/2018       Lab Results   Component Value Date    WBC 4.60 04/20/2018    HGB 11.8 (L) 04/20/2018    HCT 37.6 04/20/2018    MCV 89 04/20/2018     04/20/2018      Lab Results   Component Value Date    GLU 86 10/17/2017     (H) 10/17/2017    K 4.4 10/17/2017     10/17/2017    CO2 21 10/17/2017    BUN 18 10/17/2017    CREATININE 1.37 (H) 10/17/2017    CALCIUM 8.6 (L) 10/17/2017    MG 2.0 04/04/2010    ALT 65 (H) 10/17/2017    AST 69 (H) 10/17/2017    ALBUMIN 4.6 10/17/2017    BILITOT 0.6 10/17/2017    BILIDIR 0.7 (H) 03/25/2013       ASSESSMENT/PLAN:     Sedation Plan: moderate   Patient will undergo liver biopsy random.    REZA JUAREZ  PGY-III Radiology Resident  [address] [facility]  Pager: [phone]

## 2018-04-20 NOTE — SEDATION DOCUMENTATION
Dr. An aware of Pt's elevated BP.  He discussed with staff MD. Decision made to proceed with liver biopsy made.

## 2018-04-20 NOTE — PROGRESS NOTES
Pt arrived to US 10 for ultrasound liver biopsy.  Name verified using two identifiers.  Allergies verified.  Will continue to monitor.

## 2018-04-25 ENCOUNTER — TELEPHONE (OUTPATIENT)
Dept: TRANSPLANT | Facility: CLINIC | Age: 53
End: 2018-04-25

## 2018-04-25 NOTE — TELEPHONE ENCOUNTER
----- Message from Tay Claros MD sent at 4/23/2018  1:42 PM CDT -----  Please advise that liver biopsy unremarkable

## 2018-04-25 NOTE — TELEPHONE ENCOUNTER
Called patient to let her know ultrasound was stable, biopsy did not show anything remarkable.  Let patient know we will review tomorrow in Path conference and will call with any new treatments.  Asked for a call back of when the patient can repeat labs, so I can send a one-time order.

## 2018-05-02 LAB
EXT ALBUMIN: 3.8
EXT ALKALINE PHOSPHATASE: 168
EXT ALT: 106
EXT AST: 108
EXT BASOPHIL%: 0.5
EXT BILIRUBIN TOTAL: 0.7
EXT BUN: 24.48
EXT CALCIUM: 8.36
EXT CHLORIDE: 107
EXT CO2: 26
EXT CREATININE: 1.4 MG/DL
EXT EOSINOPHIL%: 1.9
EXT GFR MDRD NON AF AMER: 40
EXT GLUCOSE: 79
EXT HEMATOCRIT: 34.6
EXT HEMOGLOBIN: 11.2
EXT LYMPH%: 10.1
EXT MONOCYTES%: 16.4
EXT PLATELETS: 233
EXT POTASSIUM: 4.6
EXT PROTEIN TOTAL: 7.1
EXT SEGS%: 70.8
EXT SODIUM: 141 MMOL/L
EXT TACROLIMUS LVL: 17.13
EXT WBC: 3.77

## 2018-05-04 DIAGNOSIS — Z94.4 LIVER REPLACED BY TRANSPLANT: ICD-10-CM

## 2018-05-04 NOTE — TELEPHONE ENCOUNTER
Labs reviewed by Dr. Claros  Called patient to let her know to decrease Prograf to 1 mg bid and repeat labs on 05/15/18,

## 2018-05-04 NOTE — TELEPHONE ENCOUNTER
----- Message from Tay Claros MD sent at 5/3/2018 10:26 AM CDT -----  Reduce tac to 1/1  ----- Message -----  From: Indigo Pastor RN  Sent: 5/3/2018  10:22 AM  To: Tay Claros MD    Yes it is accurate.  ----- Message -----  From: Tay Claros MD  Sent: 5/3/2018   9:20 AM  To: [facility] Post-Liver Transplant Clinical    Is tac level accurate?

## 2018-05-05 RX ORDER — TACROLIMUS 1 MG/1
1 CAPSULE ORAL EVERY 12 HOURS
Qty: 60 CAPSULE | Refills: 11 | Status: SHIPPED | OUTPATIENT
Start: 2018-05-05 | End: 2019-04-23 | Stop reason: SDUPTHER

## 2018-05-17 ENCOUNTER — TELEPHONE (OUTPATIENT)
Dept: TRANSPLANT | Facility: CLINIC | Age: 53
End: 2018-05-17

## 2018-05-17 NOTE — TELEPHONE ENCOUNTER
Called patient because she was due to have labs on Tuesday, left voicemail to call back with lab date.

## 2018-05-31 LAB
EXT ALBUMIN: 4
EXT ALKALINE PHOSPHATASE: 187
EXT ALT: 129
EXT AST: 137
EXT BILIRUBIN TOTAL: 0.8
EXT BUN: 28.61
EXT CALCIUM: 8.72
EXT CHLORIDE: 106
EXT CO2: 26
EXT CREATININE: 1.94 MG/DL
EXT GFR MDRD AF AMER: 33
EXT GFR MDRD NON AF AMER: 27
EXT GLUCOSE: 77
EXT POTASSIUM: 4.77
EXT PROTEIN TOTAL: 7.3
EXT SODIUM: 142 MMOL/L
EXT TACROLIMUS LVL: 3.88

## 2018-06-01 ENCOUNTER — TELEPHONE (OUTPATIENT)
Dept: TRANSPLANT | Facility: CLINIC | Age: 53
End: 2018-06-01

## 2018-06-01 NOTE — TELEPHONE ENCOUNTER
Reviewed labs with Dr. Claros and the labs are stable.  Notified patient via telephone to increase her fluid intake.  repeat labs on 6/5/18.

## 2018-06-01 NOTE — TELEPHONE ENCOUNTER
----- Message from Tay Claros MD sent at 6/1/2018 10:36 AM CDT -----  Have patient increase fluids and repeat labs on Monday

## 2018-06-06 LAB
EXT ALBUMIN: 3.9
EXT ALKALINE PHOSPHATASE: 191
EXT ALT: 130
EXT AST: 120
EXT BILIRUBIN TOTAL: 0.7
EXT BUN: 34.74
EXT CALCIUM: 8.05
EXT CHLORIDE: 107
EXT CO2: 23
EXT CREATININE: 1.87 MG/DL
EXT GFR MDRD NON AF AMER: 29
EXT GLUCOSE: 68
EXT POTASSIUM: 4.32
EXT PROTEIN TOTAL: 7.1
EXT SODIUM: 140 MMOL/L
EXT TACROLIMUS LVL: 4.85

## 2018-06-07 ENCOUNTER — TELEPHONE (OUTPATIENT)
Dept: TRANSPLANT | Facility: CLINIC | Age: 53
End: 2018-06-07

## 2018-06-07 NOTE — TELEPHONE ENCOUNTER
----- Message from Tay Claros MD sent at 6/6/2018  2:03 PM CDT -----  Results reviewed. No action.

## 2018-06-07 NOTE — TELEPHONE ENCOUNTER
Labs reviewed by Dr. Claros  Continue routine labs no changes needed.  Letter sent for next lab appointment 07/19/18, to recheck creatinine and liver enzymes

## 2018-07-16 NOTE — PROGRESS NOTES
Transplant Hepatology  Liver Transplant Recipient Follow-up    Transplant Date: 3/31/2010  UNOS Native Liver Dx: Primary Liver Malignancy: Hepatoma (HCC) and Cirrhosis    Ngoc is here for follow up of her liver transplant.    ORGAN: LIVER  Whole or Partial: whole liver  Donor Type:  - brain death (ASA 81 mg daily)  CDC High Risk: no  Donor CMV Status: Positive  Donor HCV Status: Negative  Donor HBcAb: Negative  Donor HBV BUBBA:   Donor HCV BUBBA:   Biliary Anastomosis:   Arterial Anatomy:   IVC reconstruction:   Portal vein status:     ORGAN: LIVER CRYPTOGENIC/HCC Stage 2  SEROLOGIES R/D: CMV -/+ HCV -/- HBcAb -/-    She has had the following complications since transplant: renal insufficiency and de rajesh autoimmune hepatitis. The noted complications need to be addressed more thoroughly today.    Subjective:     Interval History: Ngoc was last seen on 2017. Currently, she is doing well. Current complaints include sinus congestion (currently using Flonase rx), no other issues.     H/o Autoimmune hepatitis, liver enzymes and alk phos remain elevated (elevated, fluctuating since ) increased 3/2018 but stable since increased 3/2018. No features of autoimmune hepatitis on last biopsy 2018    Length of time post transplant: 8 years  Reason for transplant: cryptogenic cirrhosis, HCC stage 2    PREVIOUS BIOPSIES and Treatment History:  -- 2015: Biopsy    No ACR, No evidence of acute cellular rejection  Mild ductular reaction, considerations include drug, low grade duct obstruction, infection and other  No ductopenia (bile ducts present in almost all portal tracts)  No fibrosis, minimal (1+) hepatocyte iron  Iron and trichrome stains with appropriate controls  Discussed in conference:  Liver Biopsy: no rejection, some fat, non-specific findings   Plan: push tac/ repeat bx if numbers don't improve    2015   -- liver enzymes improved but Prograf level 15.6, recent UTI, ticks after hiking, watery diarrhea.  Cellcept and prograf decreased    5/20/2016 AST 65, ALT 72, Prograf 9.7, decreased prograf dose  6/02/2016 ALT 51, AST 57 (improving), good allograft function, decreased prograf. Changed to Myfortiq due to diarrhea 6/2016 11/21/2016 AST (116) , ALT (134)  and alk phos (243) increased, US and biopsy asap    --  11/23/16 - Biopsy   Not diagnostic of acute cellular rejection.   Mild sinusoidal dilatation.   No significant steatosis   No significant fibrosis seen on routine H&E sections.   Results of iron stain and trichrome to be issued in an addendum.   No significant plasma cell population  4/2018 enzymes elevated more, increased Prograf to 2 mg BID, US and biopsy   -- 4/2018 - Biopsy    Not diagnostic of acute cellular rejection   No significant steatosis   No ductopenia   Portal and lobular macrophages, nonspecific   Considerations include reaction to prior injury   No fibrosis, no hepatocyte iron, macrophage iron present   Iron and trichrome stains with appropriate controls  6/1/2018 Creat 1.8, instructed to increase fluids    Today - repeat kidney function and liver enzymes    Current Immunosuppression: currently taking tacro 1 mg BID, Myfortic 360 mg BID    Previous difficulty affording medications, currently no issues     Liver allograft function: liver enzymes remain elevated, but stable on labs per Dr. Claros 6/2018, awaiting labs today     Labs 6/2018 - outside labs:      Alk phos 191  Creat 1.87    Kidney function worsening since 5/2018, goal is 8-9 glasses of water daily, average intake of 6-7 glasses daily 4. Not currently followed by nephrology  Denies recurrent infections, cancer   Precancerous cells in cervix, procedure for removal Dr. Kayleigh Blood upcoming   No hospitalizations since last visit  HTN - PCP monitoring BP, started Losartan ~ 6/2018, does not check BP at home     HEALTH MAINTENANCE:  1. Immunosuppression: tacro 1 mg BID, Myfortic 360 mg BID  2. Last DXA scan:  none in chart, recommend with PCP  3. Colonoscopy - completed 2018 per pt report, repeat in 7 years per pt report (?)  4. Last dermatology skin assessment - needs visit (due yearly)  5. Mammogram: due now, pt to schedule   7. Completed HCC screening 2016      Review of Systems   Constitutional: Negative for activity change, appetite change, fatigue, fever and unexpected weight change.   HENT: Negative.    Eyes: Negative.    Respiratory: Negative for cough and shortness of breath.    Cardiovascular: Negative for chest pain, palpitations and leg swelling.   Gastrointestinal: Negative for abdominal distention, diarrhea and nausea.   Endocrine: Negative.    Genitourinary: Negative.    Musculoskeletal: Negative.    Skin: Negative for rash.   Neurological: Negative.    Hematological: Negative for adenopathy.   Psychiatric/Behavioral: Negative.        Objective:     Physical Exam   Constitutional: She is oriented to person, place, and time. She appears well-developed and well-nourished.   HENT:   Head: Normocephalic and atraumatic.   Eyes: Pupils are equal, round, and reactive to light. No scleral icterus.   Cardiovascular: Normal rate, regular rhythm and normal heart sounds.    No murmur heard.  Pulmonary/Chest: Effort normal and breath sounds normal. She has no wheezes. She has no rales.   Abdominal: Soft. Bowel sounds are normal. She exhibits no distension. There is no tenderness. There is no rebound.   Musculoskeletal: Normal range of motion. She exhibits no edema.   Lymphadenopathy:     She has no cervical adenopathy.   Neurological: She is alert and oriented to person, place, and time.   Skin: Skin is warm and dry. No rash noted.   Psychiatric: She has a normal mood and affect. Her behavior is normal. Judgment and thought content normal.     Lab Results   Component Value Date    WBC 4.60 04/20/2018    HGB 11.8 (L) 04/20/2018    HCT 37.6 04/20/2018     04/20/2018       Assessment/Plan:     1. Liver transplanted     2. Prophylactic immunotherapy    3. History of malignant hepatoma    4. Autoimmune hepatitis    5. Elevated liver enzymes    6. Cryptogenic cirrhosis    7. CKD (chronic kidney disease) stage 3, GFR 30-59 ml/min    8. Abnormal cervical Papanicolaou smear, unspecified abnormal pap finding        1. S/p liver transplant 2010, liver disease due to cryptogenic cirrhosis, HCC stage 2  Donor: BDB ASA 81 mg daily   US for BDB: completed     2. Immunosuppression - currently taking prograf 1 mg BID, Myfortiq 360 mg BID  --- needs updated Prograf level, kidney function decreasing     3. Health maintenance  -- avoid sun, wears hat when outside, use sunscreen with at least 15 spf at all times when outside, schedule yearly dermatology skin checks (at higher risk for skin cancer post transplant)  -- DXA scan to assess for bone thinning (osteoporosis), recommend repeat with PCP  -- Colonoscopy per the USPSTF screening guidelines with PCP  -- GYN exam/ Pap smear/ mammogram per USPSTF screening guidelines   -- Consider Fibroscan yearly, although last biopsy with no concerns for fibrosis    4. CKD stage 3  -- consider spot protein-to-creatinine ratio   -- if have to eliminate CNIs due to CKD, may be able to use Sirolimus + Myfortiq but cautious given noted h/o AIH and elevated liver enzymes, will send message to Dr. Claros    5. HTN  -- PCP monitoring BP, started Losartan ~ 6/2018, does not check BP at home    6. Abnormal pap smear  (pt report)  -- instructed pt to send report post procedure to liver transplant coordinator    9. Financial difficulties  -- receiving financial support for Prograf and Cellcept until end of the year at least  -- current insurance policy with high deductible (~$2800/year)  -- instructed pt to notify transplant team of any difficulty with future medication needs or if she finds about worsening medication coverage/affordability for 2019    RTC in 1 year, labs monthly currently     Note routed to  post-liver transplant coordinator pool (Indigo Pastor RN) and Dr. Claros    1. Renal function decreasing further. Liver enzymes elevated but stable since 3/2018. Last biopsy 4/2018 without AIH features. Not sure of ability to change to Rapa given elevated liver enzymes and h/o autoimmune hepatitis noted in chart?  2. Instructed to hydrate more given report of not adequately hydrating, Repeat labs in 1 week to repeat K and renal function  3. Referral to nephrology?  4. Indigo to organize obtaining prograf level (had taken prograf before labs today)    Discussed above case with Dr. Claros, will refer to nephrology, complete above list, consider changing to Rapa so will obtain pre-conversion Rapa labs    Sent message with above to BHUPINDER Starr NP         OS Patient Status  Functional Status: 100% - Normal, no complaints, no evidence of disease  Physical Capacity: No Limitations

## 2018-07-17 ENCOUNTER — OFFICE VISIT (OUTPATIENT)
Dept: TRANSPLANT | Facility: CLINIC | Age: 53
End: 2018-07-17
Payer: COMMERCIAL

## 2018-07-17 ENCOUNTER — TELEPHONE (OUTPATIENT)
Dept: TRANSPLANT | Facility: CLINIC | Age: 53
End: 2018-07-17

## 2018-07-17 ENCOUNTER — LAB VISIT (OUTPATIENT)
Dept: LAB | Facility: HOSPITAL | Age: 53
End: 2018-07-17
Attending: INTERNAL MEDICINE
Payer: COMMERCIAL

## 2018-07-17 VITALS
HEART RATE: 75 BPM | HEIGHT: 60 IN | RESPIRATION RATE: 16 BRPM | BODY MASS INDEX: 22.85 KG/M2 | SYSTOLIC BLOOD PRESSURE: 140 MMHG | WEIGHT: 116.38 LBS | DIASTOLIC BLOOD PRESSURE: 82 MMHG | OXYGEN SATURATION: 100 % | TEMPERATURE: 98 F

## 2018-07-17 DIAGNOSIS — R74.8 ELEVATED LIVER ENZYMES: ICD-10-CM

## 2018-07-17 DIAGNOSIS — R87.619 ABNORMAL CERVICAL PAPANICOLAOU SMEAR, UNSPECIFIED ABNORMAL PAP FINDING: ICD-10-CM

## 2018-07-17 DIAGNOSIS — Z94.4 LIVER REPLACED BY TRANSPLANT: ICD-10-CM

## 2018-07-17 DIAGNOSIS — Z94.4 LIVER TRANSPLANTED: Primary | ICD-10-CM

## 2018-07-17 DIAGNOSIS — Z85.05 HISTORY OF MALIGNANT HEPATOMA: Chronic | ICD-10-CM

## 2018-07-17 DIAGNOSIS — K75.4 AUTOIMMUNE HEPATITIS: ICD-10-CM

## 2018-07-17 DIAGNOSIS — N18.30 CKD (CHRONIC KIDNEY DISEASE) STAGE 3, GFR 30-59 ML/MIN: ICD-10-CM

## 2018-07-17 DIAGNOSIS — Z94.4 LIVER REPLACED BY TRANSPLANT: Primary | ICD-10-CM

## 2018-07-17 DIAGNOSIS — K74.69 CRYPTOGENIC CIRRHOSIS: ICD-10-CM

## 2018-07-17 DIAGNOSIS — Z29.89 PROPHYLACTIC IMMUNOTHERAPY: Chronic | ICD-10-CM

## 2018-07-17 LAB
ALBUMIN SERPL BCP-MCNC: 4.2 G/DL
ALP SERPL-CCNC: 174 U/L
ALT SERPL W/O P-5'-P-CCNC: 115 U/L
ANION GAP SERPL CALC-SCNC: 10 MMOL/L
AST SERPL-CCNC: 112 U/L
BASOPHILS # BLD AUTO: 0.04 K/UL
BASOPHILS NFR BLD: 1 %
BILIRUB SERPL-MCNC: 1 MG/DL
BUN SERPL-MCNC: 21 MG/DL
CALCIUM SERPL-MCNC: 8.3 MG/DL
CHLORIDE SERPL-SCNC: 103 MMOL/L
CO2 SERPL-SCNC: 24 MMOL/L
CREAT SERPL-MCNC: 2 MG/DL
DIFFERENTIAL METHOD: ABNORMAL
EOSINOPHIL # BLD AUTO: 0.2 K/UL
EOSINOPHIL NFR BLD: 4.6 %
ERYTHROCYTE [DISTWIDTH] IN BLOOD BY AUTOMATED COUNT: 13.1 %
EST. GFR  (AFRICAN AMERICAN): 32.8 ML/MIN/1.73 M^2
EST. GFR  (NON AFRICAN AMERICAN): 28.5 ML/MIN/1.73 M^2
GLUCOSE SERPL-MCNC: 86 MG/DL
HCT VFR BLD AUTO: 33.8 %
HGB BLD-MCNC: 11.1 G/DL
IMM GRANULOCYTES # BLD AUTO: 0 K/UL
IMM GRANULOCYTES NFR BLD AUTO: 0 %
LYMPHOCYTES # BLD AUTO: 0.4 K/UL
LYMPHOCYTES NFR BLD: 8.7 %
MCH RBC QN AUTO: 29.2 PG
MCHC RBC AUTO-ENTMCNC: 32.8 G/DL
MCV RBC AUTO: 89 FL
MONOCYTES # BLD AUTO: 0.6 K/UL
MONOCYTES NFR BLD: 13.3 %
NEUTROPHILS # BLD AUTO: 3 K/UL
NEUTROPHILS NFR BLD: 72.4 %
NRBC BLD-RTO: 0 /100 WBC
PLATELET # BLD AUTO: 241 K/UL
PMV BLD AUTO: 9.8 FL
POTASSIUM SERPL-SCNC: 5.3 MMOL/L
PROT SERPL-MCNC: 7.6 G/DL
RBC # BLD AUTO: 3.8 M/UL
SODIUM SERPL-SCNC: 137 MMOL/L
WBC # BLD AUTO: 4.12 K/UL

## 2018-07-17 PROCEDURE — 3008F BODY MASS INDEX DOCD: CPT | Mod: CPTII,S$GLB,, | Performed by: NURSE PRACTITIONER

## 2018-07-17 PROCEDURE — 80053 COMPREHEN METABOLIC PANEL: CPT

## 2018-07-17 PROCEDURE — 99999 PR PBB SHADOW E&M-EST. PATIENT-LVL V: CPT | Mod: PBBFAC,,, | Performed by: NURSE PRACTITIONER

## 2018-07-17 PROCEDURE — 99214 OFFICE O/P EST MOD 30 MIN: CPT | Mod: S$GLB,,, | Performed by: NURSE PRACTITIONER

## 2018-07-17 PROCEDURE — 36415 COLL VENOUS BLD VENIPUNCTURE: CPT

## 2018-07-17 PROCEDURE — 85025 COMPLETE CBC W/AUTO DIFF WBC: CPT

## 2018-07-17 RX ORDER — CLONAZEPAM 0.5 MG/1
1 TABLET ORAL DAILY PRN
COMMUNITY
Start: 2018-05-11

## 2018-07-17 RX ORDER — LOSARTAN POTASSIUM 50 MG/1
100 TABLET ORAL DAILY
COMMUNITY
Start: 2018-07-05 | End: 2021-11-11

## 2018-07-17 RX ORDER — ALENDRONATE SODIUM 70 MG/1
1 TABLET ORAL WEEKLY
COMMUNITY
Start: 2018-06-26 | End: 2020-09-21

## 2018-07-17 RX ORDER — FLUTICASONE PROPIONATE 50 MCG
1 SPRAY, SUSPENSION (ML) NASAL 2 TIMES DAILY
COMMUNITY
Start: 2018-06-26

## 2018-07-17 NOTE — LETTER
July 18, 2018        Deandre Morton Jr.  [address]  [place] [address]  Phone: [phone]  Fax: [phone]             [facility] - Liver Transplant  [address]  [facility] [address]  Phone: [phone]   Patient: Ngoc Cruz   MR Number: [id number]   YOB: 1965   Date of Visit: 7/17/2018       Dear Dr. Deandre Morton Jr.    Thank you for referring Ngoc Cruz to me for evaluation. Attached you will find relevant portions of my assessment and plan of care.    If you have questions, please do not hesitate to call me. I look forward to following Ngoc Cruz along with you.    Sincerely,    Sarah Perez, JUDY    Enclosure    If you would like to receive this communication electronically, please contact [email or web address] or [phone] to request [employer] access.    [employer] is a tool which provides read-only access to select patient information with whom you have a relationship. Its easy to use and provides real time access to review your patients record including encounter summaries, notes, results, and demographic information.    If you feel you have received this communication in error or would no longer like to receive these types of communications, please e-mail [email or web address]

## 2018-07-17 NOTE — Clinical Note
Kash Cross,  I saw Ms. Cruz today, wanted to see what you thought:  1. Renal function decreasing further. Liver enzymes elevated but stable since 3/2018. Last biopsy 4/2018 without AIH features. Not sure of ability to change to Rapa given elevated liver enzymes and h/o autoimmune hepatitis noted in chart? 2. Instructed to hydrate more given report of not adequately hydrating, Repeat labs in 1 week to repeat K and renal function 3. Referral to nephrology? 4. Indigo to organize obtaining prograf level (had taken prograf before labs today)  Thanks Sarah

## 2018-07-17 NOTE — PATIENT INSTRUCTIONS
-- You have an increased risk of infection (bacterial, viral, or fungal infections) because you are on immunosuppression medications  -- Higher chance of high blood pressure, diabetes, high cholesterol and weight gain post transplant, so recommend to follow closely with your primary doctor to monitor for these conditions or worsening of these conditions  -- increased risk of cancer, including skin cancers so avoid sun, wears hat when outside, use sunscreen with at least 15 spf at all times when outside, schedule yearly dermatology skin checks   -- increased risk of kidney disease with the use of immunosuppression medications  -- colonoscopy with your primary care provider based on guidelines  -- Pap smear and mammogram per guidelines with your OB/GYN    1. Send OB/GYN records to Indigo after your procedure  2. Labs today   3. Increase water intake to at least 80 ounces daily, may need to increase to 100 ounces

## 2018-07-20 ENCOUNTER — TELEPHONE (OUTPATIENT)
Dept: TRANSPLANT | Facility: CLINIC | Age: 53
End: 2018-07-20

## 2018-07-20 NOTE — TELEPHONE ENCOUNTER
Called patient to let her know to get labs next week will do extra labs to possible switch to Rapamune.

## 2018-07-26 LAB
EXT ALBUMIN: 4
EXT ALKALINE PHOSPHATASE: 170
EXT ALT: 116
EXT AST: 109
EXT BILIRUBIN TOTAL: 0.8
EXT BUN: 24.16
EXT CALCIUM: 7.4
EXT CHLORIDE: 108
EXT CHOLESTEROL: 131
EXT CO2: 23
EXT CREATININE: 1.64 MG/DL
EXT GFR MDRD NON AF AMER: 33
EXT GLUCOSE: 76
EXT HDL: 59
EXT LDL CHOLESTEROL: 56
EXT POTASSIUM: 4.97
EXT PROT/CREAT RATIO UR: 238
EXT PROTEIN TOTAL: 7.7
EXT SODIUM: 143 MMOL/L
EXT TACROLIMUS LVL: 3.03
EXT TRIGLYCERIDES: 55

## 2018-08-01 ENCOUNTER — TELEPHONE (OUTPATIENT)
Dept: TRANSPLANT | Facility: CLINIC | Age: 53
End: 2018-08-01

## 2018-08-01 NOTE — TELEPHONE ENCOUNTER
Called patient to let her need to see Nephrologist. She will find one locally and I will send in referral.  She is taking calcium supplement given to her by her gynecologist.  She reports having osteopetrosis.  Had bone density done locally.   She will repeat labs on 08/14/18

## 2018-08-01 NOTE — TELEPHONE ENCOUNTER
----- Message from Tay Claros MD sent at 7/26/2018  3:16 PM CDT -----  Do we know the cause of the hypocalcemia? Can we refer to endocrinology? Thx

## 2018-08-15 ENCOUNTER — TELEPHONE (OUTPATIENT)
Dept: TRANSPLANT | Facility: CLINIC | Age: 53
End: 2018-08-15

## 2018-08-15 DIAGNOSIS — R79.89 ELEVATED SERUM CREATININE: Primary | ICD-10-CM

## 2018-08-15 NOTE — TELEPHONE ENCOUNTER
----- Message from Imelda Porras sent at 8/15/2018  3:35 PM CDT -----  Contact: Patient  Needs Advice    Reason for call:    Would like to speak with Indigo  Communication Preference: [phone]  Additional Information: n/a

## 2018-08-17 ENCOUNTER — TELEPHONE (OUTPATIENT)
Dept: TRANSPLANT | Facility: CLINIC | Age: 53
End: 2018-08-17

## 2018-08-17 LAB
EXT ALBUMIN: 4.1
EXT ALKALINE PHOSPHATASE: 191
EXT ALT: 135
EXT AST: 124
EXT BASOPHIL%: 0.8
EXT BILIRUBIN TOTAL: 0.8
EXT BUN: 18.68
EXT CALCIUM: 7.98
EXT CHLORIDE: 105
EXT CO2: 26
EXT CREATININE: 1.62 MG/DL
EXT EOSINOPHIL%: 4.4
EXT GFR MDRD NON AF AMER: 33
EXT GLUCOSE: 77
EXT HEMATOCRIT: 31.8
EXT HEMOGLOBIN: 10.4
EXT LYMPH%: 9
EXT MONOCYTES%: 16.2
EXT PLATELETS: 229
EXT POTASSIUM: 4.85
EXT PROTEIN TOTAL: 7.6
EXT SEGS%: 69.6
EXT SODIUM: 140 MMOL/L
EXT TACROLIMUS LVL: 3.38
EXT WBC: 3.65

## 2018-08-17 NOTE — TELEPHONE ENCOUNTER
Labs reviewed by Dr. Claros  Continue routine labs no changes needed.  Letter sent for next lab appointment 09/18/18

## 2018-08-17 NOTE — TELEPHONE ENCOUNTER
----- Message from Tay Claros MD sent at 8/17/2018  9:03 AM CDT -----  Results reviewed. Please advise labs are stable.

## 2018-08-27 ENCOUNTER — TELEPHONE (OUTPATIENT)
Dept: TRANSPLANT | Facility: CLINIC | Age: 53
End: 2018-08-27

## 2018-08-28 ENCOUNTER — TELEPHONE (OUTPATIENT)
Dept: TRANSPLANT | Facility: CLINIC | Age: 53
End: 2018-08-28

## 2018-08-28 NOTE — TELEPHONE ENCOUNTER
----- Message from Chandrika Anuj sent at 8/28/2018 11:48 AM CDT -----  Contact: pt  JOHANNE    Reason for call:    Wanted to inform coordinator that she has an appointment with  Nephrologist Sept. 27  Communication Preference: [phone]  Additional Information: n/a

## 2018-09-12 ENCOUNTER — TELEPHONE (OUTPATIENT)
Dept: TRANSPLANT | Facility: CLINIC | Age: 53
End: 2018-09-12

## 2018-09-12 NOTE — TELEPHONE ENCOUNTER
----- Message from Kamlesh Wan sent at 9/12/2018  2:14 PM CDT -----  Contact: patient   Patient needs to get her labs done but they don't have updated lab orders. Patient also have a medical question about her urine test. She would like a call today if possible.           Please call [phone]      Thanks!

## 2018-09-20 LAB
EXT ALBUMIN: 4.1
EXT ALKALINE PHOSPHATASE: 166
EXT ALT: 120
EXT AST: 118
EXT BASOPHIL%: 0.6
EXT BILIRUBIN TOTAL: 0.9
EXT BUN: 21.2
EXT CALCIUM: 7.72
EXT CHLORIDE: 106
EXT CO2: 25
EXT CREATININE: 1.78 MG/DL
EXT EOSINOPHIL%: 2.3
EXT GFR MDRD NON AF AMER: 30
EXT GLUCOSE: 73
EXT HEMATOCRIT: 31.6
EXT HEMOGLOBIN: 10.1
EXT LYMPH%: 7
EXT MONOCYTES%: 13
EXT PLATELETS: 243
EXT POTASSIUM: 4.64
EXT PROTEIN TOTAL: 7.5
EXT SEGS%: 76.9
EXT SODIUM: 140 MMOL/L
EXT TACROLIMUS LVL: 3.29
EXT WBC: 4.86

## 2018-09-21 ENCOUNTER — TELEPHONE (OUTPATIENT)
Dept: TRANSPLANT | Facility: CLINIC | Age: 53
End: 2018-09-21

## 2018-09-21 NOTE — TELEPHONE ENCOUNTER
----- Message from Tay Claros MD sent at 9/21/2018  8:40 AM CDT -----  Results reviewed. Please advise labs are stable.

## 2018-09-21 NOTE — TELEPHONE ENCOUNTER
Labs reviewed by Dr. Claros  Continue routine labs no changes needed.  Letter sent for next lab appointment 10/22/18

## 2018-09-25 RX ORDER — MYCOPHENOLIC ACID 180 MG/1
TABLET, DELAYED RELEASE ORAL
Qty: 120 TABLET | Refills: 6 | Status: SHIPPED | OUTPATIENT
Start: 2018-09-25 | End: 2019-04-23 | Stop reason: SDUPTHER

## 2018-10-11 ENCOUNTER — TELEPHONE (OUTPATIENT)
Dept: TRANSPLANT | Facility: CLINIC | Age: 53
End: 2018-10-11

## 2018-10-11 NOTE — TELEPHONE ENCOUNTER
Calling on behalf of Dr Claros to Dr Kayleigh Valdes to let her know patient is cleared from a liver transplant perspective for her to have the Hysterectomy.    [phone]-fax

## 2018-10-31 ENCOUNTER — TELEPHONE (OUTPATIENT)
Dept: TRANSPLANT | Facility: CLINIC | Age: 53
End: 2018-10-31

## 2018-10-31 LAB
EXT ALBUMIN: 4
EXT ALKALINE PHOSPHATASE: 216
EXT ALT: 153
EXT AST: 141
EXT BASOPHIL%: 0.5
EXT BILIRUBIN TOTAL: 0.7
EXT BUN: 20.59
EXT CALCIUM: 8.12
EXT CHLORIDE: 106
EXT CO2: 25
EXT CREATININE: 1.75 MG/DL
EXT EOSINOPHIL%: 1.4
EXT GFR MDRD NON AF AMER: 31
EXT GLUCOSE: 75
EXT HEMATOCRIT: 32.7
EXT HEMOGLOBIN: 10.3
EXT LYMPH%: 8.2
EXT MONOCYTES%: 14.1
EXT PLATELETS: 255
EXT POTASSIUM: 4.5
EXT PROTEIN TOTAL: 7.5
EXT SEGS%: 75.6
EXT SODIUM: 141 MMOL/L
EXT TACROLIMUS LVL: 4.03
EXT WBC: 4.17

## 2018-10-31 NOTE — TELEPHONE ENCOUNTER
----- Message from Tay Claros MD sent at 10/31/2018  8:41 AM CDT -----  Results reviewed. Please advise labs are stable.

## 2018-10-31 NOTE — TELEPHONE ENCOUNTER
Labs reviewed by Dr. Claros  Continue routine labs no changes needed.  Letter sent for next lab appointment 11/27/18

## 2018-11-29 ENCOUNTER — TELEPHONE (OUTPATIENT)
Dept: TRANSPLANT | Facility: CLINIC | Age: 53
End: 2018-11-29

## 2018-11-29 NOTE — TELEPHONE ENCOUNTER
----- Message from Lauryn Mckeon sent at 11/29/2018 12:24 PM CST -----  Patient Returning Call from [facility]    Who Left Message for Patient: Indigo Dawit  Communication Preference:  [phone]  Additional Information: pt states she is waiting on orders from another dr to complete blood and will do it on Friday or Monday

## 2018-12-04 LAB
EXT ALBUMIN: 4
EXT ALKALINE PHOSPHATASE: 328
EXT ALT: 167
EXT AST: 164
EXT BASOPHIL%: 0.8
EXT BILIRUBIN TOTAL: 0.7
EXT BUN: 27.12
EXT CALCIUM: 7.91
EXT CHLORIDE: 109
EXT CO2: 22
EXT CREATININE: 1.67 MG/DL
EXT EOSINOPHIL%: 5.1
EXT GFR MDRD NON AF AMER: 39
EXT GLUCOSE: 80
EXT HEMATOCRIT: 33.4
EXT HEMOGLOBIN: 10.6
EXT LYMPH%: 7.5
EXT MONOCYTES%: 17.3
EXT PLATELETS: 234
EXT POTASSIUM: 4.1
EXT PROTEIN TOTAL: 7.3
EXT SEGS%: 69
EXT SODIUM: 143 MMOL/L
EXT TACROLIMUS LVL: 3.67
EXT WBC: 3.71

## 2018-12-05 ENCOUNTER — HOSPITAL ENCOUNTER (OUTPATIENT)
Dept: RADIOLOGY | Facility: HOSPITAL | Age: 53
Discharge: HOME | End: 2018-12-05
Attending: INTERNAL MEDICINE
Payer: COMMERCIAL

## 2018-12-05 DIAGNOSIS — Z94.4 LIVER REPLACED BY TRANSPLANT: Primary | ICD-10-CM

## 2018-12-05 DIAGNOSIS — Z94.4 LIVER REPLACED BY TRANSPLANT: ICD-10-CM

## 2018-12-05 PROCEDURE — 76705 ECHO EXAM OF ABDOMEN: CPT | Mod: 26,59,, | Performed by: RADIOLOGY

## 2018-12-05 PROCEDURE — 93975 VASCULAR STUDY: CPT | Mod: 26,,, | Performed by: RADIOLOGY

## 2018-12-05 PROCEDURE — 76705 ECHO EXAM OF ABDOMEN: CPT | Mod: TC,PO

## 2018-12-05 PROCEDURE — 93975 VASCULAR STUDY: CPT | Mod: TC,PO

## 2018-12-06 ENCOUNTER — TELEPHONE (OUTPATIENT)
Dept: TRANSPLANT | Facility: CLINIC | Age: 53
End: 2018-12-06

## 2018-12-06 NOTE — TELEPHONE ENCOUNTER
Labs reviewed by Dr. Claros  Patient had US yesterday which was reviewed and was stable.  Called patient she is having surgery tomorrow which she was cleared for.  I sent message to Dr Claros to let him know in case there were any concerns from latest labs, will let her know if there are, otherwise will get labs in about 6 weeks 01/08/19

## 2018-12-06 NOTE — TELEPHONE ENCOUNTER
----- Message from Tay Claros MD sent at 12/6/2018 11:06 AM CST -----  Please advise u/s mario stable

## 2018-12-06 NOTE — TELEPHONE ENCOUNTER
----- Message from Tay Claros MD sent at 12/6/2018  2:17 PM CST -----  Yes. We will need to follow her liver numbers closely after surgery  ----- Message -----  From: Indigo Pastor RN  Sent: 12/6/2018  11:25 AM  To: Tay Claros MD    She is due to have her Hysterectomy tomorrow.  Is that still ok?

## 2019-01-04 ENCOUNTER — TELEPHONE (OUTPATIENT)
Dept: TRANSPLANT | Facility: CLINIC | Age: 54
End: 2019-01-04

## 2019-01-04 NOTE — TELEPHONE ENCOUNTER
Called patient to see how her surgery went, and to see if she can have her labs done, left voicemail.

## 2019-01-11 ENCOUNTER — TELEPHONE (OUTPATIENT)
Dept: TRANSPLANT | Facility: CLINIC | Age: 54
End: 2019-01-11

## 2019-01-11 LAB
EXT ALBUMIN: 4.1
EXT ALKALINE PHOSPHATASE: 219
EXT ALT: 105
EXT AST: 98
EXT BASOPHIL%: 0.9
EXT BILIRUBIN TOTAL: 0.8
EXT BUN: 26.74
EXT CALCIUM: 9.31
EXT CHLORIDE: 107
EXT CO2: 25
EXT CREATININE: 1.98 MG/DL
EXT EOSINOPHIL%: 7.7
EXT GFR MDRD NON AF AMER: 27
EXT GLUCOSE: 73
EXT HEMATOCRIT: 33.1
EXT HEMOGLOBIN: 10.5
EXT LYMPH%: 11.6
EXT MONOCYTES%: 11.9
EXT PLATELETS: 226
EXT POTASSIUM: 5.09
EXT PROTEIN TOTAL: 7.4
EXT SEGS%: 67.6
EXT SODIUM: 140 MMOL/L
EXT TACROLIMUS LVL: 2.81
EXT WBC: 3.52

## 2019-01-11 NOTE — TELEPHONE ENCOUNTER
----- Message from Tay Claros MD sent at 1/11/2019 11:01 AM CST -----  Results reviewed. Please advise labs are stable.

## 2019-01-11 NOTE — TELEPHONE ENCOUNTER
Labs reviewed by Dr. Claros  Called patient to let her know labs are better than they have been. Patient will have appointment with her Nephrologist next week.   Continue routine labs no changes needed.  Letter sent for next lab appointment 02/19/19

## 2019-02-27 ENCOUNTER — TELEPHONE (OUTPATIENT)
Dept: TRANSPLANT | Facility: CLINIC | Age: 54
End: 2019-02-27

## 2019-02-27 LAB
EXT ALBUMIN: 4.1
EXT ALKALINE PHOSPHATASE: 202
EXT ALT: 101
EXT AST: 88
EXT BASOPHIL%: 0.8
EXT BILIRUBIN TOTAL: 0.7
EXT BUN: 27.1
EXT CALCIUM: 7.98
EXT CHLORIDE: 110
EXT CO2: 21
EXT CREATININE: 1.74 MG/DL
EXT EOSINOPHIL%: 2.8
EXT GFR MDRD NON AF AMER: 31
EXT GLUCOSE: 79
EXT HEMATOCRIT: 32.6
EXT HEMOGLOBIN: 10.3
EXT LYMPH%: 10
EXT MONOCYTES%: 14.5
EXT PLATELETS: 226
EXT POTASSIUM: 5.01
EXT PROTEIN TOTAL: 7.2
EXT SEGS%: 71.6
EXT SODIUM: 141 MMOL/L
EXT TACROLIMUS LVL: 3.5
EXT WBC: 3.99

## 2019-02-27 NOTE — TELEPHONE ENCOUNTER
Labs reviewed by Dr. Claros  Continue routine labs no changes needed.  Letter sent for next lab appointment 04/23/19

## 2019-02-27 NOTE — TELEPHONE ENCOUNTER
----- Message from Tay Claros MD sent at 2/27/2019 10:20 AM CST -----  Results reviewed. Please advise labs are stable.

## 2019-03-06 ENCOUNTER — TELEPHONE (OUTPATIENT)
Dept: TRANSPLANT | Facility: CLINIC | Age: 54
End: 2019-03-06

## 2019-03-06 NOTE — TELEPHONE ENCOUNTER
----- Message from Chandrika Anuj sent at 3/6/2019  9:50 AM CST -----  Contact: Patient  Needs Advice    Reason for call: Patient asks if coordinator could call [employer] for assistance with her PROGRAF and MYFORTIC, like before.     Communication Preference: [phone]    Additional Information: n/a

## 2019-03-06 NOTE — TELEPHONE ENCOUNTER
Returned call patient states I have to call and have [employer] renew authorization from last year for Myfortic and Jake spoke to Janel.   Jake approved 03/06/19-03/05/2020 Case # [id number]  Myfortic approved 03/06/19-03/05/2020 Case# [id number]    Call to let patient know.

## 2019-03-06 NOTE — TELEPHONE ENCOUNTER
Spoke to Sohail at patient's pharmacy he advised I needed to call and get tier exception.  Called [employer] to initiate a tier exception spoke to Janel.    Patient's Tacrolimus is $47.00 doing towards deductible so can do exception until meets it.  Myfortic is $ 303.39 which is going toward deductible also.    Called patient back to let her know she will call insurance back and let me know.

## 2019-03-06 NOTE — TELEPHONE ENCOUNTER
----- Message from Chandrika Leslie sent at 3/6/2019 11:32 AM CST -----  Contact: Patient  Needs Advice    Reason for call: called to f/u with Indigo about her medications.         Communication Preference: [phone]    Additional Information: n/a

## 2019-04-16 LAB
EXT ALBUMIN: 3.9
EXT ALKALINE PHOSPHATASE: 217
EXT ALT: 120
EXT AST: 104
EXT BASOPHIL%: 0.7
EXT BILIRUBIN TOTAL: 0.6
EXT BUN: 24.07
EXT CALCIUM: 7.83
EXT CHLORIDE: 109
EXT CO2: 23
EXT CREATININE: 1.5 MG/DL
EXT EOSINOPHIL%: 2.7
EXT GFR MDRD NON AF AMER: 37
EXT GLUCOSE: 73
EXT HEMATOCRIT: 31.8
EXT HEMOGLOBIN: 9.8
EXT LYMPH%: 8
EXT MONOCYTES%: 11.8
EXT PLATELETS: 229
EXT POTASSIUM: 4.78
EXT PROTEIN TOTAL: 7.2
EXT SEGS%: 76.6
EXT SODIUM: 140 MMOL/L
EXT TACROLIMUS LVL: 3.6
EXT WBC: 4.49

## 2019-04-17 ENCOUNTER — TELEPHONE (OUTPATIENT)
Dept: TRANSPLANT | Facility: CLINIC | Age: 54
End: 2019-04-17

## 2019-04-17 NOTE — TELEPHONE ENCOUNTER
----- Message from Tay Claros MD sent at 4/16/2019  3:48 PM CDT -----  Results reviewed. Please advise labs are stable.

## 2019-04-17 NOTE — TELEPHONE ENCOUNTER
Labs reviewed by Dr. Claros  Continue routine labs no changes needed.  Letter sent for next lab appointment 07/16/19

## 2019-04-23 DIAGNOSIS — Z94.4 LIVER REPLACED BY TRANSPLANT: ICD-10-CM

## 2019-04-23 RX ORDER — MYCOPHENOLIC ACID 180 MG/1
TABLET, DELAYED RELEASE ORAL
Qty: 120 TABLET | Refills: 6 | Status: SHIPPED | OUTPATIENT
Start: 2019-04-23 | End: 2019-12-20 | Stop reason: SDUPTHER

## 2019-04-23 RX ORDER — TACROLIMUS 1 MG/1
CAPSULE, GELATIN COATED ORAL
Qty: 60 CAPSULE | Refills: 11 | Status: SHIPPED | OUTPATIENT
Start: 2019-04-23 | End: 2020-03-24

## 2019-07-16 LAB
EXT ALBUMIN: 4.1
EXT ALKALINE PHOSPHATASE: 249
EXT ALT: 128
EXT AST: 114
EXT BASOPHIL%: 0.8
EXT BILIRUBIN TOTAL: 0.6
EXT BUN: 30.27
EXT CALCIUM: 8.61
EXT CHLORIDE: 107
EXT CO2: 23
EXT CREATININE: 1.83 MG/DL
EXT EOSINOPHIL%: 1.8
EXT GFR MDRD NON AF AMER: 29
EXT GLUCOSE: 75
EXT HEMATOCRIT: 34
EXT HEMOGLOBIN: 10.7
EXT LYMPH%: 7
EXT MONOCYTES%: 17
EXT PLATELETS: 247
EXT POTASSIUM: 4.54
EXT PROTEIN TOTAL: 7.3
EXT SEGS%: 73.1
EXT SODIUM: 139 MMOL/L
EXT TACROLIMUS LVL: 3.38
EXT WBC: 3.9

## 2019-08-08 ENCOUNTER — TELEPHONE (OUTPATIENT)
Dept: TRANSPLANT | Facility: CLINIC | Age: 54
End: 2019-08-08

## 2019-08-08 NOTE — TELEPHONE ENCOUNTER
----- Message from Tay Claros MD sent at 8/7/2019  9:53 PM CDT -----  Reviewed. No action.  ----- Message -----  From: Indigo Pastor RN  Sent: 8/7/2019   4:43 PM  To: Tay Claros MD    I never got a review of this patient's labs.      Thanks

## 2019-08-08 NOTE — TELEPHONE ENCOUNTER
Labs reviewed by Dr. Claros  Continue routine labs no changes needed.  Letter sent for next lab appointment 10/15/19

## 2019-09-30 ENCOUNTER — OFFICE VISIT (OUTPATIENT)
Dept: TRANSPLANT | Facility: CLINIC | Age: 54
End: 2019-09-30
Attending: INTERNAL MEDICINE
Payer: COMMERCIAL

## 2019-09-30 VITALS
HEART RATE: 75 BPM | TEMPERATURE: 98 F | SYSTOLIC BLOOD PRESSURE: 125 MMHG | BODY MASS INDEX: 22.44 KG/M2 | DIASTOLIC BLOOD PRESSURE: 77 MMHG | WEIGHT: 111.31 LBS | RESPIRATION RATE: 16 BRPM | HEIGHT: 59 IN | OXYGEN SATURATION: 100 %

## 2019-09-30 DIAGNOSIS — Z94.4 LIVER TRANSPLANTED: Primary | ICD-10-CM

## 2019-09-30 DIAGNOSIS — Z29.89 PROPHYLACTIC IMMUNOTHERAPY: Chronic | ICD-10-CM

## 2019-09-30 DIAGNOSIS — E88.01 ALPHA-1-ANTITRYPSIN DEFICIENCY: ICD-10-CM

## 2019-09-30 DIAGNOSIS — R74.8 ELEVATED LIVER ENZYMES: ICD-10-CM

## 2019-09-30 DIAGNOSIS — K75.4 AUTOIMMUNE HEPATITIS: ICD-10-CM

## 2019-09-30 DIAGNOSIS — N18.30 CKD (CHRONIC KIDNEY DISEASE) STAGE 3, GFR 30-59 ML/MIN: ICD-10-CM

## 2019-09-30 PROCEDURE — 3008F PR BODY MASS INDEX (BMI) DOCUMENTED: ICD-10-PCS | Mod: CPTII,S$GLB,, | Performed by: INTERNAL MEDICINE

## 2019-09-30 PROCEDURE — 99213 PR OFFICE/OUTPT VISIT, EST, LEVL III, 20-29 MIN: ICD-10-PCS | Mod: S$GLB,,, | Performed by: INTERNAL MEDICINE

## 2019-09-30 PROCEDURE — 99999 PR PBB SHADOW E&M-EST. PATIENT-LVL III: ICD-10-PCS | Mod: PBBFAC,,, | Performed by: INTERNAL MEDICINE

## 2019-09-30 PROCEDURE — 99999 PR PBB SHADOW E&M-EST. PATIENT-LVL III: CPT | Mod: PBBFAC,,, | Performed by: INTERNAL MEDICINE

## 2019-09-30 PROCEDURE — 3008F BODY MASS INDEX DOCD: CPT | Mod: CPTII,S$GLB,, | Performed by: INTERNAL MEDICINE

## 2019-09-30 PROCEDURE — 99213 OFFICE O/P EST LOW 20 MIN: CPT | Mod: S$GLB,,, | Performed by: INTERNAL MEDICINE

## 2019-09-30 RX ORDER — CALCITRIOL 0.5 UG/1
0.5 CAPSULE ORAL DAILY
COMMUNITY

## 2019-09-30 RX ORDER — CALCIUM CARBONATE 200(500)MG
4 TABLET,CHEWABLE ORAL DAILY
COMMUNITY

## 2019-09-30 NOTE — LETTER
October 5, 2019        Deandre Morton Jr.  [address]  [place] [address]  Phone: [phone]  Fax: [phone]             [facility] - Liver Transplant  [address]  [facility] [address]  Phone: [phone]   Patient: Ngoc Cruz   MR Number: [id number]   YOB: 1965   Date of Visit: 9/30/2019       Dear Dr. Deandre Morton Jr.    Thank you for referring Ngoc Cruz to me for evaluation. Attached you will find relevant portions of my assessment and plan of care.    If you have questions, please do not hesitate to call me. I look forward to following Ngoc Cruz along with you.    Sincerely,    Tay Claros MD    Enclosure    If you would like to receive this communication electronically, please contact [email or web address] or [phone] to request [employer] access.    [employer] is a tool which provides read-only access to select patient information with whom you have a relationship. Its easy to use and provides real time access to review your patients record including encounter summaries, notes, results, and demographic information.    If you feel you have received this communication in error or would no longer like to receive these types of communications, please e-mail [email or web address]

## 2019-10-05 NOTE — PROGRESS NOTES
Transplant Hepatology  Liver Transplant Recipient Follow-up    Transplant Date: 3/31/2010  UNOS Native Liver Dx: Primary Liver Malignancy: Hepatoma (HCC) and Cirrhosis    Ngoc is here for follow up of her liver transplant.    ORGAN: LIVER  Whole or Partial: whole liver  Donor Type:  - brain death  CDC High Risk: no  Donor CMV Status: positive  Donor HCV Status: negative  Donor HBcAb: negative  Biliary Anastomosis:   Arterial Anatomy:   IVC reconstruction:   Portal vein status:     She has had the following complications since transplant: de rajesh autoimmune hepatitis. The noted complications is well controlled.    Subjective:     Interval History: Ngoc was last seen 18 by Sarah Perez NP. Currently, she is doing well. Current complaints include none. Autoimmune hepatitis in a clinical and biochemical remission. Abnormal LFTs but stable. Liver biopsy in 2018 unremarkable.    Review of Systems   Constitutional: Negative for appetite change, fatigue and unexpected weight change.   HENT: Negative for hearing loss, ear pain, sore throat and trouble swallowing.    Eyes: Negative for visual disturbance.   Respiratory: Negative for cough and shortness of breath.    Cardiovascular: Negative for chest pain and palpitations.   Gastrointestinal: Negative for nausea, vomiting and abdominal pain.   Genitourinary: Negative for dysuria and difficulty urinating.   Musculoskeletal: Negative for arthralgias.   Skin: Negative for rash.   Neurological: Negative for tremors, seizures and headaches.   Psychiatric/Behavioral: Negative for decreased concentration and agitation.       Objective:     Physical Exam   Looks well.  H+N:normal  Chest: clear to IPPA  CVS: normal heart sounds  MSK: normal  Ext: normal  Abdominal:         Lab Results   Component Value Date    BILITOT 1.4* 2013    * 2013    ALT 78* 2013    ALKPHOS 148* 2013    CREATININE 0.9 2013    ALBUMIN 3.8 2013     Lab  Results   Component Value Date    WBC 4.80 03/25/2013    HGB 10.7* 03/25/2013    HCT 33.3* 03/25/2013     03/25/2013     Lab Results   Component Value Date    TACROLIMUS 7.3 03/25/2013       Assessment/Plan:     1. Liver transplanted    2. Prophylactic immunotherapy    3. History of malignant hepatoma    4. Alpha-1-antitrypsin deficiency    5. Autoimmune hepatitis        Continue current immunosuppression.   RTC in 1 year.  Labs q 3 months.    Tay Claros MD           [facility] Patient Status  Functional Status: 100% - Normal, no complaints, no evidence of disease  Physical Capacity: No Limitations

## 2019-10-17 ENCOUNTER — TELEPHONE (OUTPATIENT)
Dept: TRANSPLANT | Facility: CLINIC | Age: 54
End: 2019-10-17

## 2019-10-17 LAB
EXT ALBUMIN: 4
EXT ALKALINE PHOSPHATASE: 171
EXT ALT: 105
EXT AST: 78
EXT BASOPHIL%: 0.9
EXT BILIRUBIN TOTAL: 0.5
EXT BUN: 24.64
EXT CALCIUM: 8.34
EXT CHLORIDE: 112
EXT CO2: 19
EXT CREATININE: 1.81 MG/DL
EXT EOSINOPHIL%: 3.4
EXT GFR MDRD NON AF AMER: 29
EXT GLUCOSE: 86
EXT HEMATOCRIT: 30.7
EXT HEMOGLOBIN: 9.7
EXT LYMPH%: 7.4
EXT MONOCYTES%: 16.8
EXT PLATELETS: 211
EXT POTASSIUM: 4.25
EXT PROTEIN TOTAL: 6.9
EXT SEGS%: 71.2
EXT SODIUM: 141 MMOL/L
EXT TACROLIMUS LVL: 2.94
EXT WBC: 3.52

## 2019-10-17 NOTE — TELEPHONE ENCOUNTER
Labs reviewed by Dr. Claros  Continue routine labs no changes needed.  Letter sent for next lab appointment 01/14/20

## 2019-10-17 NOTE — TELEPHONE ENCOUNTER
----- Message from Tay Claros MD sent at 10/17/2019 10:31 AM CDT -----  Results reviewed. Please advise labs are stable.

## 2019-12-12 LAB
EXT ALBUMIN: 4
EXT ALKALINE PHOSPHATASE: 220
EXT ALT: 110
EXT AST: 85
EXT BASOPHIL%: 0.5
EXT BILIRUBIN TOTAL: 0.6
EXT BUN: 21.66
EXT CALCIUM: 8.38
EXT CHLORIDE: 110
EXT CO2: 21
EXT CREATININE: 1.59 MG/DL
EXT EOSINOPHIL%: 3.6
EXT GFR MDRD NON AF AMER: 34
EXT GLUCOSE: 85
EXT HEMATOCRIT: 31.8
EXT HEMOGLOBIN: 9.8
EXT LYMPH%: 6.7
EXT MONOCYTES%: 16.3
EXT PLATELETS: 241
EXT POTASSIUM: 4.74
EXT PROTEIN TOTAL: 7.2
EXT SEGS%: 72.6
EXT SODIUM: 141 MMOL/L
EXT TACROLIMUS LVL: 2.71
EXT WBC: 3.87

## 2019-12-13 ENCOUNTER — TELEPHONE (OUTPATIENT)
Dept: TRANSPLANT | Facility: CLINIC | Age: 54
End: 2019-12-13

## 2019-12-13 NOTE — TELEPHONE ENCOUNTER
----- Message from Tay Claros MD sent at 12/12/2019  1:36 PM CST -----  Results reviewed. Please advise labs are stable.

## 2019-12-13 NOTE — TELEPHONE ENCOUNTER
Labs reviewed by Dr. Claros  Continue routine labs no changes needed.  Letter sent for next lab appointment 03/09/20

## 2019-12-20 RX ORDER — MYCOPHENOLIC ACID 180 MG/1
TABLET, DELAYED RELEASE ORAL
Qty: 120 TABLET | Refills: 0 | Status: SHIPPED | OUTPATIENT
Start: 2019-12-20 | End: 2020-02-24

## 2020-02-24 RX ORDER — MYCOPHENOLIC ACID 180 MG/1
TABLET, DELAYED RELEASE ORAL
Qty: 120 TABLET | Refills: 0 | Status: SHIPPED | OUTPATIENT
Start: 2020-02-24 | End: 2020-03-19

## 2020-03-18 DIAGNOSIS — Z94.4 LIVER TRANSPLANTED: Primary | ICD-10-CM

## 2020-03-18 NOTE — TELEPHONE ENCOUNTER
----- Message from Thalia Tucker sent at 3/18/2020  4:06 PM CDT -----  Contact: PT   PT called to speak to her coordinator Indigo Pastor. Needs to talk to her about her bloodwork. Also she needs her to send a letter from the doctor to her pharmacy about the prescription assists.     Callback: [phone]

## 2020-03-18 NOTE — TELEPHONE ENCOUNTER
----- Message from Elisha Tolentino sent at 3/18/2020  4:31 PM CDT -----  Contact: PT  PT just got off the phone with her Coordinator Indigo - Would like to speak back with Destini.     Callback: [phone]

## 2020-03-18 NOTE — TELEPHONE ENCOUNTER
Returned patient call , she stated she wanted to let Indigo know that her nephrologist resceduled her labs to 4/21/20.

## 2020-03-19 RX ORDER — MYCOPHENOLIC ACID 180 MG/1
TABLET, DELAYED RELEASE ORAL
Qty: 120 TABLET | Refills: 0 | Status: SHIPPED | OUTPATIENT
Start: 2020-03-19 | End: 2020-03-24

## 2020-03-24 DIAGNOSIS — Z94.4 LIVER REPLACED BY TRANSPLANT: ICD-10-CM

## 2020-03-24 DIAGNOSIS — Z94.4 LIVER TRANSPLANTED: ICD-10-CM

## 2020-03-24 RX ORDER — MYCOPHENOLIC ACID 180 MG/1
TABLET, DELAYED RELEASE ORAL
Qty: 120 TABLET | Refills: 0 | Status: SHIPPED | OUTPATIENT
Start: 2020-03-24 | End: 2020-05-20

## 2020-03-24 RX ORDER — TACROLIMUS 1 MG/1
CAPSULE, GELATIN COATED ORAL
Qty: 60 CAPSULE | Refills: 0 | Status: SHIPPED | OUTPATIENT
Start: 2020-03-24 | End: 2020-04-17

## 2020-04-01 ENCOUNTER — TELEPHONE (OUTPATIENT)
Dept: TRANSPLANT | Facility: CLINIC | Age: 55
End: 2020-04-01

## 2020-04-01 NOTE — TELEPHONE ENCOUNTER
Returned call to answer questions about the appeal I did on was brand needed.  Spoke to Keri at [employer] for appeal questions.

## 2020-04-01 NOTE — TELEPHONE ENCOUNTER
----- Message from Peng Boogie sent at 4/1/2020  1:53 PM CDT -----  Contact: Uma hauser/nedra script   Calling in regards to pt and there medication     Call back: [phone]  (case number [id number]) case exp today

## 2020-04-02 ENCOUNTER — TELEPHONE (OUTPATIENT)
Dept: TRANSPLANT | Facility: CLINIC | Age: 55
End: 2020-04-02

## 2020-04-02 NOTE — TELEPHONE ENCOUNTER
Returned call again I spoke to the yesterday.  The had additional questions like did she try Gengraf or Cyclosprine which is not our protocol.

## 2020-04-02 NOTE — TELEPHONE ENCOUNTER
----- Message from Peng Boogie sent at 4/2/2020 11:26 AM CDT -----  Contact: Alexandra hauser/nedra script   Calling to speak with pt coordinator     Call back: [phone] (case number [id number])

## 2020-04-17 DIAGNOSIS — Z94.4 LIVER REPLACED BY TRANSPLANT: ICD-10-CM

## 2020-04-17 RX ORDER — TACROLIMUS 1 MG/1
CAPSULE ORAL
Qty: 60 CAPSULE | Refills: 0 | Status: SHIPPED | OUTPATIENT
Start: 2020-04-17 | End: 2020-07-20

## 2020-05-15 LAB
EXT ALBUMIN: 4.1
EXT ALKALINE PHOSPHATASE: 207
EXT ALT: 116
EXT AST: 87
EXT BASOPHIL%: 0.5
EXT BILIRUBIN TOTAL: 0.6
EXT BUN: 22.5
EXT CALCIUM: 9.04
EXT CHLORIDE: 108
EXT CO2: 23
EXT CREATININE: 1.75 MG/DL
EXT EOSINOPHIL%: 2.3
EXT GFR MDRD NON AF AMER: 31
EXT GLUCOSE: 79
EXT HEMATOCRIT: 31.4
EXT HEMOGLOBIN: 10.1
EXT LYMPH%: 7
EXT MONOCYTES%: 15
EXT PLATELETS: 227
EXT POTASSIUM: 4.79
EXT PROTEIN TOTAL: 7.3
EXT SEGS%: 74.9
EXT SODIUM: 141 MMOL/L
EXT TACROLIMUS LVL: 3.4
EXT WBC: 3.87

## 2020-05-20 DIAGNOSIS — Z94.4 LIVER TRANSPLANTED: ICD-10-CM

## 2020-05-20 RX ORDER — MYCOPHENOLIC ACID 180 MG/1
TABLET, DELAYED RELEASE ORAL
Qty: 120 TABLET | Refills: 0 | Status: SHIPPED | OUTPATIENT
Start: 2020-05-20 | End: 2020-06-23

## 2020-05-20 NOTE — TELEPHONE ENCOUNTER
----- Message from Tay Claros MD sent at 5/17/2020 10:10 AM CDT -----  Results reviewed. Please advise labs are stable.

## 2020-05-20 NOTE — TELEPHONE ENCOUNTER
Labs reviewed by Dr. Claros]  Continue routine labs no changes needed.  Letter sent for next lab appointment 08/04/20

## 2020-05-20 NOTE — LETTER
May 20, 2020    Ngoc Cruz  [address] [facility] [address]          Dear Ngoc Cruz:  MRN: [id number]    This is a follow up to your recent labs, your lab results were stable.  There are no medicine changes.  Please have your labs drawn again on 08/03/20.      If you cannot have your labs drawn on the scheduled date, it is your responsibility to call the transplant department to reschedule.  To reschedule or make an appointment, please as to speak to or leave a message for my assistant, Laurie Oleary or Britney, at [phone].  When leaving a message for Laurie Oleary Angela or myself, we ask that you leave a brief message regarding your request.    Sincerely,    Indigo Pastor RN      Your Liver Transplant Coordinator    [facility] [place]  [address] [place] [phone]

## 2020-07-22 LAB
EXT ALBUMIN: 3.9
EXT ALKALINE PHOSPHATASE: 362
EXT ALT: 110
EXT AST: 84
EXT BASOPHIL%: 0.5
EXT BILIRUBIN TOTAL: 0.4
EXT BUN: 28.01
EXT CALCIUM: 8.53
EXT CHLORIDE: 110
EXT CO2: 22
EXT CREATININE: 1.76 MG/DL
EXT EOSINOPHIL%: 1.8
EXT GFR MDRD NON AF AMER: 30
EXT GLUCOSE: 75
EXT HEMATOCRIT: 33.2
EXT HEMOGLOBIN: 10.4
EXT LYMPH%: 4.7
EXT MONOCYTES%: 11.5
EXT PLATELETS: 232
EXT POTASSIUM: 5.11
EXT PROTEIN TOTAL: 7.1
EXT SEGS%: 81.3
EXT SODIUM: 142 MMOL/L
EXT TACROLIMUS LVL: 4
EXT WBC: 5.57

## 2020-07-23 ENCOUNTER — TELEPHONE (OUTPATIENT)
Dept: TRANSPLANT | Facility: CLINIC | Age: 55
End: 2020-07-23

## 2020-07-23 NOTE — TELEPHONE ENCOUNTER
Sent patient a letter to let her know labs are stable, no changes. Next labs due on 10/19/20  ----- Message from Tay Claros MD sent at 7/23/2020 10:35 AM CDT -----  Results reviewed. Please advise labs are stable.

## 2020-07-23 NOTE — LETTER
July 23, 2020    Ngoc Cruz  [address] [facility] [address]          Dear Ngoc Cruz:  MRN: [id number]    This is a follow up to your recent labs, your lab results were stable.  There are no medicine changes.  Please have your labs drawn again on 10/19/20.      If you cannot have your labs drawn on the scheduled date, it is your responsibility to call the transplant department to reschedule.  Please call [phone] and ask to speak to [facility] for all scheduling requests.     Sincerely,    Indigo Pastor, RN      Your Liver Transplant Coordinator    [facility] [place]  [address] [place] [phone]

## 2020-08-25 DIAGNOSIS — Z94.4 LIVER REPLACED BY TRANSPLANT: ICD-10-CM

## 2020-08-25 DIAGNOSIS — Z94.4 LIVER TRANSPLANTED: ICD-10-CM

## 2020-08-25 RX ORDER — MYCOPHENOLIC ACID 180 MG/1
TABLET, DELAYED RELEASE ORAL
Qty: 120 TABLET | Refills: 11 | Status: SHIPPED | OUTPATIENT
Start: 2020-08-25 | End: 2021-08-20 | Stop reason: SDUPTHER

## 2020-08-25 RX ORDER — TACROLIMUS 1 MG/1
1 CAPSULE ORAL EVERY 12 HOURS
Qty: 60 CAPSULE | Refills: 11 | Status: SHIPPED | OUTPATIENT
Start: 2020-08-25 | End: 2021-08-20 | Stop reason: SDUPTHER

## 2020-09-21 ENCOUNTER — OFFICE VISIT (OUTPATIENT)
Dept: TRANSPLANT | Facility: CLINIC | Age: 55
End: 2020-09-21
Attending: INTERNAL MEDICINE
Payer: COMMERCIAL

## 2020-09-21 DIAGNOSIS — Z29.89 PROPHYLACTIC IMMUNOTHERAPY: Primary | Chronic | ICD-10-CM

## 2020-09-21 DIAGNOSIS — K75.4 AUTOIMMUNE HEPATITIS: ICD-10-CM

## 2020-09-21 DIAGNOSIS — Z94.4 LIVER TRANSPLANTED: ICD-10-CM

## 2020-09-21 PROCEDURE — 99213 PR OFFICE/OUTPT VISIT, EST, LEVL III, 20-29 MIN: ICD-10-PCS | Mod: 95,,, | Performed by: INTERNAL MEDICINE

## 2020-09-21 PROCEDURE — 99213 OFFICE O/P EST LOW 20 MIN: CPT | Mod: 95,,, | Performed by: INTERNAL MEDICINE

## 2020-09-21 NOTE — LETTER
September 21, 2020        Deandre Morton Jr.  [address] [address]  Phone: [phone]  Fax: [phone]             [facility] 1st Fl  [address]  [facility] [address]  Phone: [phone]   Patient: Ngoc Cruz   MR Number: [id number]   YOB: 1965   Date of Visit: 9/21/2020       Dear Dr. Deandre Morton Jr.    Thank you for referring Ngoc Cruz to me for evaluation. Attached you will find relevant portions of my assessment and plan of care.    If you have questions, please do not hesitate to call me. I look forward to following Ngoc Cruz along with you.    Sincerely,    Tay Claros MD    Enclosure    If you would like to receive this communication electronically, please contact [email or web address] or [phone] to request [employer] Link access.    [employer] Link is a tool which provides read-only access to select patient information with whom you have a relationship. Its easy to use and provides real time access to review your patients record including encounter summaries, notes, results, and demographic information.    If you feel you have received this communication in error or would no longer like to receive these types of communications, please e-mail [email or web address]

## 2020-10-21 ENCOUNTER — TELEPHONE (OUTPATIENT)
Dept: TRANSPLANT | Facility: CLINIC | Age: 55
End: 2020-10-21

## 2020-10-21 LAB
EXT ALBUMIN: 3.6
EXT ALKALINE PHOSPHATASE: 284
EXT ALT: 94
EXT AST: 78
EXT BASOPHIL%: 0.6
EXT BILIRUBIN TOTAL: 0.5
EXT BUN: 19.07
EXT CALCIUM: 9.1
EXT CHLORIDE: 108
EXT CO2: 26
EXT CREATININE: 1.72 MG/DL
EXT EOSINOPHIL%: 4
EXT GFR MDRD NON AF AMER: 31
EXT GLUCOSE: 76
EXT HEMATOCRIT: 32.2
EXT HEMOGLOBIN: 10
EXT LYMPH%: 8.5
EXT MONOCYTES%: 14.6
EXT PLATELETS: 271
EXT POTASSIUM: 4.8
EXT PROTEIN TOTAL: 6.8
EXT SEGS%: 72
EXT SODIUM: 142 MMOL/L
EXT TACROLIMUS LVL: 2.7
EXT WBC: 3.28

## 2020-10-21 NOTE — TELEPHONE ENCOUNTER
Continue routine labs no changes needed.  Letter sent for next lab appointment 01/12/01      ----- Message from Tay Claros MD sent at 10/21/2020 10:07 AM CDT -----  Results reviewed. Please advise labs are stable.

## 2020-10-21 NOTE — LETTER
October 21, 2020    Ngoc Cruz  [address] [facility] [address]          Dear Ngoc Cruz:  MRN: [id number]    This is a follow up to your recent labs, your lab results were stable.  There are no medicine changes.  Please have your labs drawn again on 01/12/01.      If you cannot have your labs drawn on the scheduled date, it is your responsibility to call the transplant department to reschedule.  Please call [phone] and ask to speak to [facility] for all scheduling requests.     Sincerely,    Indigo Pastor, RN      Your Liver Transplant Coordinator    [facility] [place]  [address] [place] [phone]

## 2021-01-08 ENCOUNTER — PATIENT MESSAGE (OUTPATIENT)
Dept: TRANSPLANT | Facility: CLINIC | Age: 56
End: 2021-01-08

## 2021-01-22 LAB
EXT ALBUMIN: 3.7
EXT ALKALINE PHOSPHATASE: 409
EXT ALT: 110
EXT AST: 116
EXT BASOPHIL%: 1.1
EXT BILIRUBIN TOTAL: 0.4
EXT BUN: 19.07
EXT CALCIUM: 8.2
EXT CHLORIDE: 110
EXT CO2: 22
EXT CREATININE: 1.51 MG/DL
EXT EOSINOPHIL%: 3.7
EXT GFR MDRD NON AF AMER: 36
EXT GLUCOSE: 84
EXT HEMATOCRIT: 31.6
EXT HEMOGLOBIN: 9.9
EXT LYMPH%: 5.7
EXT MONOCYTES%: 16.3
EXT PLATELETS: 256
EXT POTASSIUM: 4
EXT PROTEIN TOTAL: 6.7
EXT SEGS%: 73.2
EXT SODIUM: 142 MMOL/L
EXT TACROLIMUS LVL: 2.96
EXT WBC: 3.5

## 2021-01-28 ENCOUNTER — TELEPHONE (OUTPATIENT)
Dept: TRANSPLANT | Facility: CLINIC | Age: 56
End: 2021-01-28

## 2021-04-12 ENCOUNTER — TELEPHONE (OUTPATIENT)
Dept: TRANSPLANT | Facility: CLINIC | Age: 56
End: 2021-04-12

## 2021-05-21 LAB
EXT ALBUMIN: 3.8
EXT ALKALINE PHOSPHATASE: 226
EXT ALT: 91
EXT AST: 68
EXT BASOPHIL%: 0.7
EXT BILIRUBIN TOTAL: 0.6
EXT BUN: 22.46
EXT CALCIUM: 8.3
EXT CHLORIDE: 115
EXT CO2: 21
EXT CREATININE: 1.51 MG/DL
EXT EOSINOPHIL%: 3
EXT GFR MDRD NON AF AMER: 36
EXT GLUCOSE: 70
EXT HEMATOCRIT: 30.3
EXT HEMOGLOBIN: 9.7
EXT LYMPH%: 4.2
EXT MONOCYTES%: 11.9
EXT PLATELETS: 272
EXT POTASSIUM: 3.8
EXT PROTEIN TOTAL: 7.1
EXT SEGS%: 79.9
EXT SODIUM: 146 MMOL/L
EXT TACROLIMUS LVL: 2.65
EXT WBC: 5.72

## 2021-05-24 ENCOUNTER — TELEPHONE (OUTPATIENT)
Dept: TRANSPLANT | Facility: CLINIC | Age: 56
End: 2021-05-24

## 2021-08-11 ENCOUNTER — TELEPHONE (OUTPATIENT)
Dept: TRANSPLANT | Facility: CLINIC | Age: 56
End: 2021-08-11

## 2021-08-27 ENCOUNTER — TELEPHONE (OUTPATIENT)
Dept: TRANSPLANT | Facility: CLINIC | Age: 56
End: 2021-08-27

## 2021-09-21 ENCOUNTER — TELEPHONE (OUTPATIENT)
Dept: TRANSPLANT | Facility: CLINIC | Age: 56
End: 2021-09-21

## 2021-09-27 ENCOUNTER — TELEPHONE (OUTPATIENT)
Dept: TRANSPLANT | Facility: CLINIC | Age: 56
End: 2021-09-27

## 2021-09-28 ENCOUNTER — TELEPHONE (OUTPATIENT)
Dept: TRANSPLANT | Facility: CLINIC | Age: 56
End: 2021-09-28

## 2021-10-01 ENCOUNTER — TELEPHONE (OUTPATIENT)
Dept: TRANSPLANT | Facility: CLINIC | Age: 56
End: 2021-10-01

## 2021-10-11 LAB
EXT ALBUMIN: 3.6
EXT ALKALINE PHOSPHATASE: 393
EXT ALT: 112
EXT AST: 113
EXT BASOPHIL%: 0.7
EXT BILIRUBIN TOTAL: 0.4
EXT BUN: 17.32
EXT CALCIUM: 9
EXT CHLORIDE: 111
EXT CO2: 23
EXT CREATININE: 1.71 MG/DL
EXT EOSINOPHIL%: 3.5
EXT GFR MDRD NON AF AMER: 31
EXT GLUCOSE: 80
EXT HEMATOCRIT: 31
EXT HEMOGLOBIN: 9.8
EXT LYMPH%: 4
EXT MONOCYTES%: 14.7
EXT PLATELETS: 266
EXT POTASSIUM: 4.2
EXT PROTEIN TOTAL: 6.8
EXT SEGS%: 76.9
EXT SODIUM: 144 MMOL/L
EXT TACROLIMUS LVL: 2.09
EXT WBC: 4.29

## 2021-10-12 ENCOUNTER — TELEPHONE (OUTPATIENT)
Dept: TRANSPLANT | Facility: CLINIC | Age: 56
End: 2021-10-12

## 2021-11-10 ENCOUNTER — PATIENT MESSAGE (OUTPATIENT)
Dept: TRANSPLANT | Facility: CLINIC | Age: 56
End: 2021-11-10
Payer: COMMERCIAL

## 2021-11-10 ENCOUNTER — TELEPHONE (OUTPATIENT)
Dept: TRANSPLANT | Facility: CLINIC | Age: 56
End: 2021-11-10
Payer: COMMERCIAL

## 2021-11-11 ENCOUNTER — OFFICE VISIT (OUTPATIENT)
Dept: TRANSPLANT | Facility: CLINIC | Age: 56
End: 2021-11-11
Attending: INTERNAL MEDICINE
Payer: COMMERCIAL

## 2021-11-11 DIAGNOSIS — Z94.4 LIVER TRANSPLANTED: ICD-10-CM

## 2021-11-11 DIAGNOSIS — Z29.89 PROPHYLACTIC IMMUNOTHERAPY: Primary | Chronic | ICD-10-CM

## 2021-11-11 PROCEDURE — 1159F PR MEDICATION LIST DOCUMENTED IN MEDICAL RECORD: ICD-10-PCS | Mod: CPTII,95,, | Performed by: INTERNAL MEDICINE

## 2021-11-11 PROCEDURE — 4010F PR ACE/ARB THEARPY RXD/TAKEN: ICD-10-PCS | Mod: CPTII,95,, | Performed by: INTERNAL MEDICINE

## 2021-11-11 PROCEDURE — 4010F ACE/ARB THERAPY RXD/TAKEN: CPT | Mod: CPTII,95,, | Performed by: INTERNAL MEDICINE

## 2021-11-11 PROCEDURE — 1160F RVW MEDS BY RX/DR IN RCRD: CPT | Mod: CPTII,95,, | Performed by: INTERNAL MEDICINE

## 2021-11-11 PROCEDURE — 99213 PR OFFICE/OUTPT VISIT, EST, LEVL III, 20-29 MIN: ICD-10-PCS | Mod: 95,,, | Performed by: INTERNAL MEDICINE

## 2021-11-11 PROCEDURE — 1160F PR REVIEW ALL MEDS BY PRESCRIBER/CLIN PHARMACIST DOCUMENTED: ICD-10-PCS | Mod: CPTII,95,, | Performed by: INTERNAL MEDICINE

## 2021-11-11 PROCEDURE — 1159F MED LIST DOCD IN RCRD: CPT | Mod: CPTII,95,, | Performed by: INTERNAL MEDICINE

## 2021-11-11 PROCEDURE — 99213 OFFICE O/P EST LOW 20 MIN: CPT | Mod: 95,,, | Performed by: INTERNAL MEDICINE

## 2022-01-21 ENCOUNTER — TELEPHONE (OUTPATIENT)
Dept: TRANSPLANT | Facility: CLINIC | Age: 57
End: 2022-01-21
Payer: COMMERCIAL

## 2022-01-21 NOTE — TELEPHONE ENCOUNTER
Returned call will fax PA    ----- Message from Babar Wan sent at 1/21/2022 10:02 AM CST -----  Regarding: JAMA YUN for prograf    [facility]    Mode 1

## 2022-02-25 LAB
EXT ALBUMIN: 3.6
EXT ALKALINE PHOSPHATASE: 560
EXT ALT: 165
EXT AST: 167
EXT BASOPHIL%: 0.9
EXT BILIRUBIN TOTAL: 0.8
EXT BUN: 22.09
EXT CALCIUM: 9.2
EXT CHLORIDE: 109
EXT CO2: 20
EXT CREATININE: 1.8 MG/DL
EXT EOSINOPHIL%: 18.5
EXT GFR MDRD NON AF AMER: 29
EXT GLUCOSE: 76
EXT HEMATOCRIT: 33
EXT HEMOGLOBIN: 10.5
EXT LYMPH%: 4.5
EXT MONOCYTES%: 10.5
EXT PLATELETS: 302
EXT POTASSIUM: 4.7
EXT PROTEIN TOTAL: 6.9
EXT SEGS%: 65.6
EXT SODIUM: 144 MMOL/L
EXT TACROLIMUS LVL: 2.43
EXT WBC: 4.48

## 2022-02-28 LAB — ALPHA 1 ANTITRYPSIN PHENOTYPE: 163

## 2022-03-02 ENCOUNTER — TELEPHONE (OUTPATIENT)
Dept: TRANSPLANT | Facility: CLINIC | Age: 57
End: 2022-03-02
Payer: COMMERCIAL

## 2022-03-02 NOTE — TELEPHONE ENCOUNTER
Continue routine labs no changes needed.  Letter sent for next lab appointment 05/16/22      ----- Message from Tay Claros MD sent at 3/2/2022  7:46 AM CST -----  Results reviewed. No action.

## 2022-05-11 ENCOUNTER — PATIENT MESSAGE (OUTPATIENT)
Dept: RESEARCH | Facility: CLINIC | Age: 57
End: 2022-05-11
Payer: COMMERCIAL

## 2022-05-18 ENCOUNTER — TELEPHONE (OUTPATIENT)
Dept: TRANSPLANT | Facility: CLINIC | Age: 57
End: 2022-05-18
Payer: COMMERCIAL

## 2022-05-18 NOTE — TELEPHONE ENCOUNTER
Returned call and faxed order    ----- Message from Britney Campo MA sent at 5/18/2022  9:53 AM CDT -----  Regarding: FW: Lab orders  Please read below.  Orders faxed and she states she is going today.  ----- Message -----  From: Oli Santillan  Sent: 5/18/2022   8:56 AM CDT  To: [facility] Post-Liver Transplant Non-Clinical  Subject: Lab orders                                       Pt states she is having labs completed today and would a standing order placed at [facility]    Fax: [phone]    Please call pt when order is placed Ph: [phone]

## 2022-05-19 LAB
EXT ALBUMIN: 4
EXT ALKALINE PHOSPHATASE: 450
EXT ALT: 115
EXT AST: 96
EXT BASOPHIL%: 0.3
EXT BILIRUBIN TOTAL: 0.7
EXT BUN: 18.37
EXT CALCIUM: 8.39
EXT CHLORIDE: 108
EXT CO2: 23
EXT CREATININE: 1.61 MG/DL
EXT EGFR NO RACE VARIABLE: 33
EXT EOSINOPHIL%: 0.7
EXT GLUCOSE: 80
EXT HEMATOCRIT: 33.8
EXT HEMOGLOBIN: 11
EXT LYMPH%: 5.2
EXT MONOCYTES%: 11.3
EXT PLATELETS: 318
EXT POTASSIUM: 4.23
EXT PROTEIN TOTAL: 7.2
EXT SEGS%: 82.3
EXT SODIUM: 143 MMOL/L
EXT TACROLIMUS LVL: 3.26
EXT WBC: 5.77

## 2022-05-20 ENCOUNTER — TELEPHONE (OUTPATIENT)
Dept: TRANSPLANT | Facility: CLINIC | Age: 57
End: 2022-05-20
Payer: COMMERCIAL

## 2022-05-20 NOTE — LETTER
May 20, 2022    Ngoc Cruz  [address] [facility] [address]          Dear Ngoc Cruz:  MRN: [id number]    This is a follow up to your recent labs, your lab results were stable.  There are no medicine changes.  Please have your labs drawn again on 08/15/22.      If you cannot have your labs drawn on the scheduled date, it is your responsibility to call the transplant department to reschedule.  Please call [phone] and ask to speak to Britney DEJESUS   for all scheduling requests.     Sincerely,    Indigo Pastor RN      Your Liver Transplant Coordinator    [facility] [place]  [facility] [place] [address] [phone]

## 2022-05-20 NOTE — TELEPHONE ENCOUNTER
Continue routine labs no changes needed.  Letter sent for next lab appointment 08/15/22      ----- Message from Tay Claros MD sent at 5/20/2022  1:42 PM CDT -----  Results reviewed. No action.

## 2022-09-27 LAB
ALPHA 1 ANTITRYPSIN PHENOTYPE: 183
EXT ALBUMIN: 4.1
EXT ALKALINE PHOSPHATASE: 422
EXT ALT: 82
EXT AST: 70
EXT BASOPHIL%: 0.5
EXT BILIRUBIN TOTAL: 0.7
EXT BUN: 16.95
EXT CALCIUM: 9.62
EXT CHLORIDE: 107
EXT CO2: 24
EXT CREATININE: 1.78 MG/DL
EXT EOSINOPHIL%: 2.1
EXT GFR MDRD NON AF AMER: 30
EXT GLUCOSE: 81
EXT HEMATOCRIT: 34
EXT HEMOGLOBIN: 11
EXT LYMPH%: 6.6
EXT MONOCYTES%: 12.6
EXT PLATELETS: 278
EXT POTASSIUM: 4.26
EXT PROTEIN TOTAL: 7.4
EXT SEGS%: 77.9
EXT SODIUM: 144 MMOL/L
EXT TACROLIMUS LVL: 4.24
EXT WBC: 3.81

## 2022-10-13 ENCOUNTER — TELEPHONE (OUTPATIENT)
Dept: TRANSPLANT | Facility: CLINIC | Age: 57
End: 2022-10-13
Payer: COMMERCIAL

## 2022-10-13 NOTE — TELEPHONE ENCOUNTER
Continue routine labs no changes needed.  Letter sent for next lab appointment 01/09/23        ----- Message from Tya Claros MD sent at 10/13/2022  2:45 PM CDT -----  Results reviewed. No action.

## 2022-10-14 ENCOUNTER — TELEPHONE (OUTPATIENT)
Dept: TRANSPLANT | Facility: CLINIC | Age: 57
End: 2022-10-14
Payer: COMMERCIAL

## 2022-10-14 NOTE — TELEPHONE ENCOUNTER
----- Message from Tay Claros MD sent at 10/13/2022  2:45 PM CDT -----  Results reviewed. No action.

## 2022-10-14 NOTE — LETTER
October 14, 2022    Ngoc Cruz  [address] [facility] [address]          Dear Ngoc Cruz:  MRN: [id number]    This is a follow up to your recent labs, your lab results were stable.  There are no medicine changes.  Please have your labs drawn again on 01/09/23.      If you cannot have your labs drawn on the scheduled date, it is your responsibility to call the transplant department to reschedule.  Please call [phone] and ask to speak to Nadine Ruby, Medical Assistant for all scheduling requests.     Sincerely,    Indigo Pastor RN      Your Liver Transplant Coordinator    [facility] [place]  [facility] [place] [address] [phone]

## 2023-01-18 ENCOUNTER — TELEPHONE (OUTPATIENT)
Dept: TRANSPLANT | Facility: CLINIC | Age: 58
End: 2023-01-18
Payer: COMMERCIAL

## 2023-01-30 DIAGNOSIS — Z94.4 LIVER TRANSPLANTED: ICD-10-CM

## 2023-01-30 DIAGNOSIS — Z94.4 LIVER REPLACED BY TRANSPLANT: ICD-10-CM

## 2023-01-30 LAB
EXT ALBUMIN: 4
EXT ALKALINE PHOSPHATASE: 561
EXT ALT: 82
EXT AST: 80
EXT BASOPHIL%: 0.5
EXT BILIRUBIN TOTAL: 0.7
EXT BUN: 18.63
EXT CALCIUM: 8.54
EXT CHLORIDE: 108
EXT CO2: 24
EXT CREATININE: 1.74 MG/DL
EXT EOSINOPHIL%: 1.6
EXT GFR MDRD NON AF AMER: 30
EXT GLUCOSE: 84
EXT HEMATOCRIT: 36.3
EXT HEMOGLOBIN: 11.5
EXT LYMPH%: 6.8
EXT MONOCYTES%: 12.3
EXT PLATELETS: 283
EXT POTASSIUM: 4.62
EXT PROTEIN TOTAL: 7
EXT SEGS%: 78.5
EXT SODIUM: 147 MMOL/L
EXT TACROLIMUS LVL: 3.85
EXT WBC: 3.81

## 2023-01-30 RX ORDER — TACROLIMUS 1 MG/1
1 CAPSULE ORAL EVERY 12 HOURS
Qty: 180 CAPSULE | Refills: 3 | Status: SHIPPED | OUTPATIENT
Start: 2023-01-30 | End: 2024-01-31

## 2023-01-30 RX ORDER — MYCOPHENOLIC ACID 180 MG/1
360 TABLET, DELAYED RELEASE ORAL 2 TIMES DAILY
Qty: 360 TABLET | Refills: 3 | Status: SHIPPED | OUTPATIENT
Start: 2023-01-30 | End: 2024-01-31

## 2023-01-30 NOTE — TELEPHONE ENCOUNTER
Continue routine labs no changes needed.  Letter sent for next lab appointment 4/24/23      ----- Message from Tay Claros MD sent at 1/30/2023  3:55 PM CST -----  Results reviewed. No action.

## 2023-02-13 ENCOUNTER — PATIENT MESSAGE (OUTPATIENT)
Dept: TRANSPLANT | Facility: CLINIC | Age: 58
End: 2023-02-13
Payer: COMMERCIAL

## 2023-02-13 ENCOUNTER — OFFICE VISIT (OUTPATIENT)
Dept: TRANSPLANT | Facility: CLINIC | Age: 58
End: 2023-02-13
Attending: INTERNAL MEDICINE
Payer: COMMERCIAL

## 2023-02-13 DIAGNOSIS — R74.8 ELEVATED LIVER ENZYMES: ICD-10-CM

## 2023-02-13 DIAGNOSIS — Z94.4 LIVER TRANSPLANTED: Primary | ICD-10-CM

## 2023-02-13 DIAGNOSIS — Z29.89 PROPHYLACTIC IMMUNOTHERAPY: Chronic | ICD-10-CM

## 2023-02-13 PROCEDURE — 1159F MED LIST DOCD IN RCRD: CPT | Mod: CPTII,95,, | Performed by: INTERNAL MEDICINE

## 2023-02-13 PROCEDURE — 99213 PR OFFICE/OUTPT VISIT, EST, LEVL III, 20-29 MIN: ICD-10-PCS | Mod: 95,,, | Performed by: INTERNAL MEDICINE

## 2023-02-13 PROCEDURE — 99213 OFFICE O/P EST LOW 20 MIN: CPT | Mod: 95,,, | Performed by: INTERNAL MEDICINE

## 2023-02-13 PROCEDURE — 1160F RVW MEDS BY RX/DR IN RCRD: CPT | Mod: CPTII,95,, | Performed by: INTERNAL MEDICINE

## 2023-02-13 PROCEDURE — 1159F PR MEDICATION LIST DOCUMENTED IN MEDICAL RECORD: ICD-10-PCS | Mod: CPTII,95,, | Performed by: INTERNAL MEDICINE

## 2023-02-13 PROCEDURE — 1160F PR REVIEW ALL MEDS BY PRESCRIBER/CLIN PHARMACIST DOCUMENTED: ICD-10-PCS | Mod: CPTII,95,, | Performed by: INTERNAL MEDICINE

## 2023-02-13 NOTE — PROGRESS NOTES
The patient location is: work ([place])  The chief complaint leading to consultation is: post-transplant follow up    Visit type: audiovisual    Face to Face time with patient: 10 minutes  30 minutes of total time spent on the encounter, which includes face to face time and non-face to face time preparing to see the patient (eg, review of tests), Obtaining and/or reviewing separately obtained history, Documenting clinical information in the electronic or other health record, Independently interpreting results (not separately reported) and communicating results to the patient/family/caregiver, or Care coordination (not separately reported).         Each patient to whom he or she provides medical services by telemedicine is:  (1) informed of the relationship between the physician and patient and the respective role of any other health care provider with respect to management of the patient; and (2) notified that he or she may decline to receive medical services by telemedicine and may withdraw from such care at any time.    Notes:   This 57-year-old woman underwent a liver transplant in 2010 for cirrhosis complicated by hepatocellular cancer.  For the past 12 years she has been free of any clinical or radiological recurrence of hepatocellular cancer.  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  Subsequent biopsies have shown nonspecific changes but no features of acute cellular rejection, chronic rejection or autoimmune hepatitis.  She remains on tacrolimus and Myfortic.  She is no symptoms of hepatic decompensation.  I will make no changes to her immunosuppression today.  She will continue get her blood work every 4 months.  She will return to clinic in 1 year's time.

## 2023-02-13 NOTE — LETTER
February 13, 2023        Deandre Morotn Jr.  [address] [facility] [address]  Phone: [phone]  Fax: [phone]             [facility] 1st Fl  [address]  [facility] [address]  Phone: [phone]   Patient: Ngoc Cruz   MR Number: [id number]   YOB: 1965   Date of Visit: 2/13/2023       Dear Dr. Deandre Morton Jr.    Thank you for referring Ngoc Cruz to me for evaluation. Attached you will find relevant portions of my assessment and plan of care.    If you have questions, please do not hesitate to call me. I look forward to following Ngoc Cruz along with you.    Sincerely,    Tay Claros MD    Enclosure    If you would like to receive this communication electronically, please contact [email or web address] or [phone] to request [employer] Link access.    [employer] Link is a tool which provides read-only access to select patient information with whom you have a relationship. Its easy to use and provides real time access to review your patients record including encounter summaries, notes, results, and demographic information.    If you feel you have received this communication in error or would no longer like to receive these types of communications, please e-mail [email or web address]

## 2023-04-28 ENCOUNTER — TELEPHONE (OUTPATIENT)
Dept: TRANSPLANT | Facility: CLINIC | Age: 58
End: 2023-04-28
Payer: COMMERCIAL

## 2023-04-28 NOTE — TELEPHONE ENCOUNTER
Received up from patient.    ----- Message from Nadine Ruby MA sent at 4/28/2023  4:04 PM CDT -----  Regarding: FW: Speak to staff  Abilio    ----- Message -----  From: Kia Rock  Sent: 4/28/2023   9:35 AM CDT  To: [facility] Post-Liver Transplant Non-Clinical  Subject: Speak to staff                                   Pt is calling to speak to staff, to inform that blood work is scheduled for 06/07/2023.  Please advise.      [phone] (home)

## 2023-06-15 LAB
EXT ALBUMIN: 3.8
EXT ALKALINE PHOSPHATASE: 491
EXT ALT: 90
EXT AST: 94
EXT BILIRUBIN TOTAL: 0.6
EXT BUN: 19
EXT CALCIUM: 8.83
EXT CO2: 24
EXT CREATININE: 1.72 MG/DL
EXT GFR MDRD NON AF AMER: 31
EXT GLUCOSE: 80
EXT POTASSIUM: 4.5
EXT PROT/CREAT RATIO UR: 107
EXT PROTEIN TOTAL: 7.1
EXT SODIUM: 144 MMOL/L
EXT TACROLIMUS LVL: 3.83

## 2023-06-16 ENCOUNTER — TELEPHONE (OUTPATIENT)
Dept: TRANSPLANT | Facility: CLINIC | Age: 58
End: 2023-06-16
Payer: COMMERCIAL

## 2023-06-16 ENCOUNTER — PATIENT MESSAGE (OUTPATIENT)
Dept: TRANSPLANT | Facility: CLINIC | Age: 58
End: 2023-06-16
Payer: COMMERCIAL

## 2023-06-16 NOTE — TELEPHONE ENCOUNTER
Portal message sent, repeat labs 10/9/23----- Message from Tay Claros MD sent at 6/15/2023 11:34 PM CDT -----  Results reviewed. No action.

## 2023-10-13 LAB
EXT ALBUMIN: 4
EXT ALKALINE PHOSPHATASE: 331
EXT ALT: 71
EXT AST: 69
EXT BASOPHIL%: 0.4
EXT BILIRUBIN TOTAL: 0.5
EXT BUN: 15.65
EXT CALCIUM: 8.85
EXT CHLORIDE: 108
EXT CO2: 25
EXT CREATININE: 1.61 MG/DL
EXT EOSINOPHIL%: 2.3
EXT GFR MDRD NON AF AMER: 33
EXT GLUCOSE: 83
EXT HEMATOCRIT: 33.9
EXT HEMOGLOBIN: 10.8
EXT LYMPH%: 4.2
EXT MONOCYTES%: 10.5
EXT PLATELETS: 313
EXT POTASSIUM: 4.89
EXT PROTEIN TOTAL: 7.2
EXT SEGS%: 82.4
EXT SODIUM: 144 MMOL/L
EXT TACROLIMUS LVL: 5.03
EXT WBC: 4.75

## 2023-10-16 ENCOUNTER — TELEPHONE (OUTPATIENT)
Dept: TRANSPLANT | Facility: CLINIC | Age: 58
End: 2023-10-16
Payer: COMMERCIAL

## 2023-10-16 NOTE — LETTER
October 16, 2023    Ngoc Cruz  [address] [facility] [address]          Dear Ngoc Cruz:  MRN: [id number]    This is a follow up to your recent labs, your lab results were stable.  There are no medicine changes.  Please have your labs drawn again on 2/12/24.      If you cannot have your labs drawn on the scheduled date, it is your responsibility to call the transplant department to reschedule.  Please call [phone] and ask to speak to Nadine Ruby, Medical Assistant for all scheduling requests.     Sincerely,    Indigo Pastor RN      Your Liver Transplant Coordinator    [facility] [place]  [facility] [place] [address] [phone]

## 2023-10-16 NOTE — TELEPHONE ENCOUNTER
Continue routine labs no changes needed.  Letter sent for next lab appointment 2/12/24      ----- Message from Tay Claros MD sent at 10/15/2023  9:41 AM CDT -----  Results reviewed. No action.

## 2024-01-10 ENCOUNTER — TELEPHONE (OUTPATIENT)
Dept: TRANSPLANT | Facility: CLINIC | Age: 59
End: 2024-01-10
Payer: COMMERCIAL

## 2024-01-10 NOTE — TELEPHONE ENCOUNTER
Returned call will send a message to Dr Claros to see if we can change lab frequency    ----- Message from Babar Wan sent at 1/10/2024 10:44 AM CST -----  Regarding: Consult/Advisory  Contact: Ngoc Cruz  Consult/Advisory    Name Of Caller: Ngoc Cruz       Contact Preference:  [phone] (M)    Nature of call: Patient calling to speak to coordinator regarding her blood work being done every 6 months instead of 4 months. Requesting a call back.

## 2024-01-10 NOTE — TELEPHONE ENCOUNTER
Called patient to let her know that Dr Claros would rather not do labs every 6 months. Left voicemail    ----- Message from Tay Claros MD sent at 1/10/2024  4:27 PM CST -----  Probably not a good idea. She has chronically elevated LFTs  ----- Message -----  From: Indigo Pastor RN  Sent: 1/10/2024  11:00 AM CST  To: Tay Claros MD    Patient would like to know if she can do labs every 6 months?

## 2024-01-31 DIAGNOSIS — Z94.4 LIVER TRANSPLANTED: ICD-10-CM

## 2024-01-31 DIAGNOSIS — Z94.4 LIVER REPLACED BY TRANSPLANT: ICD-10-CM

## 2024-01-31 RX ORDER — MYCOPHENOLIC ACID 180 MG/1
TABLET, DELAYED RELEASE ORAL
Qty: 120 TABLET | Refills: 3 | Status: SHIPPED | OUTPATIENT
Start: 2024-01-31 | End: 2024-05-17

## 2024-01-31 RX ORDER — TACROLIMUS 1 MG/1
1 CAPSULE ORAL EVERY 12 HOURS
Qty: 180 CAPSULE | Refills: 3 | Status: SHIPPED | OUTPATIENT
Start: 2024-01-31

## 2024-02-08 ENCOUNTER — PATIENT MESSAGE (OUTPATIENT)
Dept: TRANSPLANT | Facility: CLINIC | Age: 59
End: 2024-02-08
Payer: COMMERCIAL

## 2024-02-08 ENCOUNTER — OFFICE VISIT (OUTPATIENT)
Dept: TRANSPLANT | Facility: CLINIC | Age: 59
End: 2024-02-08
Attending: INTERNAL MEDICINE
Payer: COMMERCIAL

## 2024-02-08 DIAGNOSIS — R74.8 ELEVATED LIVER ENZYMES: ICD-10-CM

## 2024-02-08 DIAGNOSIS — K75.4 AUTOIMMUNE HEPATITIS: Primary | ICD-10-CM

## 2024-02-08 DIAGNOSIS — Z94.4 LIVER TRANSPLANTED: ICD-10-CM

## 2024-02-08 DIAGNOSIS — Z29.89 PROPHYLACTIC IMMUNOTHERAPY: Chronic | ICD-10-CM

## 2024-02-08 PROCEDURE — 1160F RVW MEDS BY RX/DR IN RCRD: CPT | Mod: CPTII,95,, | Performed by: INTERNAL MEDICINE

## 2024-02-08 PROCEDURE — 1159F MED LIST DOCD IN RCRD: CPT | Mod: CPTII,95,, | Performed by: INTERNAL MEDICINE

## 2024-02-08 PROCEDURE — 99213 OFFICE O/P EST LOW 20 MIN: CPT | Mod: 95,,, | Performed by: INTERNAL MEDICINE

## 2024-02-08 NOTE — LETTER
February 8, 2024        Deandre Morton Jr.  [address] [facility] [address]  Phone: [phone]  Fax: [phone]             [facility] 1st Fl  [address]  [facility] [address]  Phone: [phone]   Patient: Ngoc Cruz   MR Number: [id number]   YOB: 1965   Date of Visit: 2/8/2024       Dear Dr. Deandre Morton Jr.    Thank you for referring Ngoc Cruz to me for evaluation. Attached you will find relevant portions of my assessment and plan of care.    If you have questions, please do not hesitate to call me. I look forward to following Ngoc Cruz along with you.    Sincerely,    Tay Claros MD    Enclosure    If you would like to receive this communication electronically, please contact [email or web address] or [phone] to request [employer] Link access.    [employer] Link is a tool which provides read-only access to select patient information with whom you have a relationship. Its easy to use and provides real time access to review your patients record including encounter summaries, notes, results, and demographic information.    If you feel you have received this communication in error or would no longer like to receive these types of communications, please e-mail [email or web address]

## 2024-02-15 ENCOUNTER — TELEPHONE (OUTPATIENT)
Dept: TRANSPLANT | Facility: CLINIC | Age: 59
End: 2024-02-15
Payer: COMMERCIAL

## 2024-02-15 NOTE — TELEPHONE ENCOUNTER
----- Message from Niurka Bartholomew MA sent at 2/15/2024  9:16 AM CST -----  Regarding: FW: Advise  Contact: [phone]    ----- Message -----  From: Annalise Ruth  Sent: 2/15/2024   9:11 AM CST  To: [facility] Staff  Subject: Advise                                           BAUDILIO LOPEZ calling regarding Patient Advice (message) for # Trudy with [facility] is calling to refax over the standing order for patient    FAX: [phone]

## 2024-02-15 NOTE — LETTER
"   LAB ORDERS    2/15/2024      ORDERING MD:   Dr. Tay Claros MD, PhD - NPI #[id number]           Patient Name: Ngoc Cruz               : 1965    [facility] Number: [id number]                  #:        Please draw the following labs: 02/15/     Test Frequency  Diagnosis/ICD-10 Code     CBC/DIFF/PLT Q 4 months  Z94.4 Liver Transplant       Complete Metabolic Panel Q 4 months  Z94.4 Liver Transplant         Prograf level Q 4 months  Z94.4 Liver Transplant          FAX RESULTS [phone] "[facility] Liver Transplant Coordinator", and send the hard copy when completed. If you have any questions regarding this request or need additional information, please call [phone].      [phone]   "

## 2024-02-19 LAB
EXT ALBUMIN: 3.8
EXT ALKALINE PHOSPHATASE: 421
EXT ALT: 93
EXT AST: 82
EXT BASOPHIL%: 0.3
EXT BILIRUBIN TOTAL: 0.5
EXT BUN: 24.3
EXT CALCIUM: 8.05
EXT CHLORIDE: 111
EXT CO2: 23
EXT CREATININE: 1.33 MG/DL
EXT EOSINOPHIL%: 0.8
EXT GFR MDRD NON AF AMER: 41
EXT GLUCOSE: 78
EXT HEMATOCRIT: 32.8
EXT HEMOGLOBIN: 10.3
EXT LYMPH%: 4.4
EXT MONOCYTES%: 14
EXT PLATELETS: 240
EXT POTASSIUM: 4.19
EXT PROTEIN TOTAL: 6.9
EXT SEGS%: 80.2
EXT SODIUM: 145 MMOL/L
EXT TACROLIMUS LVL: 3.25
EXT WBC: 3.87

## 2024-02-20 ENCOUNTER — TELEPHONE (OUTPATIENT)
Dept: TRANSPLANT | Facility: CLINIC | Age: 59
End: 2024-02-20
Payer: COMMERCIAL

## 2024-02-20 NOTE — TELEPHONE ENCOUNTER
Continue routine labs no changes needed.  Letter sent for next lab appointment 6/03/24      ----- Message from Tay Claros MD sent at 2/20/2024  8:00 AM CST -----  Results reviewed. No action.

## 2024-02-20 NOTE — LETTER
February 20, 2024    Ngoc Cruz  [address] [facility] [address]          Dear Ngoc Cruz:  MRN: [id number]    This is a follow up to your recent labs, your lab results were stable.  There are no medicine changes.  Please have your labs drawn again on 6/03/24.      If you cannot have your labs drawn on the scheduled date, it is your responsibility to call the transplant department to reschedule.  Please call [phone] and ask to speak to Nadine Ruby, Medical Assistant for all scheduling requests.     Sincerely,    Indigo Pastor RN      Your Liver Transplant Coordinator    [facility] [place]  [facility] [place] [address] [phone]

## 2024-05-16 DIAGNOSIS — Z94.4 LIVER TRANSPLANTED: ICD-10-CM

## 2024-05-17 RX ORDER — MYCOPHENOLIC ACID 180 MG/1
TABLET, DELAYED RELEASE ORAL
Qty: 120 TABLET | Refills: 3 | Status: SHIPPED | OUTPATIENT
Start: 2024-05-17

## 2024-06-14 LAB
EXT ALBUMIN: 3.8
EXT ALKALINE PHOSPHATASE: 437
EXT ALT: 84
EXT AST: 75
EXT BASOPHIL%: 0.5
EXT BILIRUBIN TOTAL: 0.5
EXT BUN: 24.87
EXT CALCIUM: 9.34
EXT CHLORIDE: 112
EXT CO2: 21
EXT CREATININE: 1.58 MG/DL
EXT EOSINOPHIL%: 2.3
EXT GFR MDRD AF AMER: 34
EXT GLUCOSE: 86
EXT HEMATOCRIT: 33.7
EXT HEMOGLOBIN: 10.7
EXT LYMPH%: 6.6
EXT MONOCYTES%: 10
EXT PLATELETS: 277
EXT POTASSIUM: 4.3
EXT PROTEIN TOTAL: 7.1
EXT SEGS%: 80.3
EXT SODIUM: 143 MMOL/L
EXT TACROLIMUS LVL: 4.21
EXT WBC: 3.91

## 2024-06-17 ENCOUNTER — TELEPHONE (OUTPATIENT)
Dept: TRANSPLANT | Facility: CLINIC | Age: 59
End: 2024-06-17
Payer: COMMERCIAL

## 2024-06-17 NOTE — LETTER
June 17, 2024    Ngoc Cruz  [address] [facility] [address]          Dear Ngoc Cruz:  MRN: [id number]    This is a follow up to your recent labs, your lab results were stable.  There are no medicine changes.  Please have your labs drawn again on 9/30/24.      If you cannot have your labs drawn on the scheduled date, it is your responsibility to call the transplant department to reschedule.  Please call [phone] and ask to speak to Nadine Ruby, Medical Assistant for all scheduling requests.     Sincerely,    Indigo Pastor RN      Your Liver Transplant Coordinator    [facility] [place]  [facility] [place] [address] [phone]

## 2024-06-17 NOTE — TELEPHONE ENCOUNTER
Continue routine labs no changes needed.  Letter sent for next lab appointment 9/30/24      ----- Message from Tay Claros MD sent at 6/17/2024  7:50 AM CDT -----  Results reviewed. No action.

## 2024-09-23 DIAGNOSIS — Z94.4 LIVER TRANSPLANTED: ICD-10-CM

## 2024-09-23 RX ORDER — MYCOPHENOLIC ACID 180 MG/1
TABLET, DELAYED RELEASE ORAL
Qty: 120 TABLET | Refills: 3 | Status: SHIPPED | OUTPATIENT
Start: 2024-09-23

## 2024-10-13 NOTE — TELEPHONE ENCOUNTER
----- Message from Kamlesh Wan sent at 10/11/2018  2:38 PM CDT -----  Contact: patient   Patient have a medical question about her surgery.           Please call [phone]        Thanks!   
Returned call doctor hoping to do Laparoscopic Hysterectomy but will do open if needed wants clearance from Dr Claros so she can schedule. [phone] Dr. Kayleigh Valdes  
[place]

## 2024-11-07 ENCOUNTER — TELEPHONE (OUTPATIENT)
Dept: TRANSPLANT | Facility: CLINIC | Age: 59
End: 2024-11-07
Payer: COMMERCIAL

## 2024-11-07 LAB
EXT ALBUMIN: 3.8
EXT ALKALINE PHOSPHATASE: 418
EXT ALT: 72
EXT AST: 71
EXT BASOPHIL%: 0.5
EXT BILIRUBIN TOTAL: 0.6
EXT BUN: 18.36
EXT CALCIUM: 8.27
EXT CHLORIDE: 111
EXT CO2: 22
EXT CREATININE: 1.63 MG/DL
EXT EOSINOPHIL%: 2
EXT GFR MDRD NON AF AMER: 33
EXT GLUCOSE: 85
EXT HEMATOCRIT: 32
EXT HEMOGLOBIN: 9.9
EXT LYMPH%: 4.3
EXT MONOCYTES%: 11
EXT PLATELETS: 293
EXT POTASSIUM: 4.03
EXT PROTEIN TOTAL: 7
EXT SEGS%: 81.9
EXT SODIUM: 145 MMOL/L
EXT TACROLIMUS LVL: 3.64
EXT WBC: 3.92

## 2024-11-07 NOTE — LETTER
November 7, 2024    Ngoc Cruz  [address] [facility] [address]          Dear Ngoc Cruz:  MRN: [id number]    This is a follow up to your recent labs, your lab results were stable.  There are no medicine changes.  Please have your labs drawn again on 3/17/25.      If you cannot have your labs drawn on the scheduled date, it is your responsibility to call the transplant department to reschedule.  Please call [phone] and ask to speak to Nadine Ruby, Medical Assistant for all scheduling requests.     Sincerely    Indigo Pastor RN      Your Liver Transplant Coordinator    [facility] [place]  [facility] [place] [address] [phone]

## 2024-11-07 NOTE — TELEPHONE ENCOUNTER
Continue routine labs no changes needed.  Letter sent for next lab appointment 3/17/25      ----- Message from Tay Claros MD sent at 11/7/2024 10:32 AM CST -----  Results reviewed. No action.

## 2025-01-07 DIAGNOSIS — Z94.4 LIVER REPLACED BY TRANSPLANT: ICD-10-CM

## 2025-01-07 RX ORDER — TACROLIMUS 1 MG/1
1 CAPSULE ORAL EVERY 12 HOURS
Qty: 180 CAPSULE | Refills: 3 | Status: SHIPPED | OUTPATIENT
Start: 2025-01-07

## 2025-01-27 DIAGNOSIS — Z94.4 LIVER TRANSPLANTED: ICD-10-CM

## 2025-01-27 RX ORDER — MYCOPHENOLIC ACID 180 MG/1
TABLET, DELAYED RELEASE ORAL
Qty: 120 TABLET | Refills: 11 | Status: SHIPPED | OUTPATIENT
Start: 2025-01-27

## 2025-03-11 ENCOUNTER — OFFICE VISIT (OUTPATIENT)
Dept: TRANSPLANT | Facility: CLINIC | Age: 60
End: 2025-03-11
Attending: INTERNAL MEDICINE
Payer: COMMERCIAL

## 2025-03-11 ENCOUNTER — PATIENT MESSAGE (OUTPATIENT)
Dept: TRANSPLANT | Facility: CLINIC | Age: 60
End: 2025-03-11

## 2025-03-11 DIAGNOSIS — Z29.89 PROPHYLACTIC IMMUNOTHERAPY: Chronic | ICD-10-CM

## 2025-03-11 DIAGNOSIS — Z94.4 LIVER TRANSPLANTED: Primary | ICD-10-CM

## 2025-03-11 NOTE — PROGRESS NOTES
The patient location is: work ([place])  The chief complaint leading to consultation is: post-transplant follow up    Visit type: audiovisual    Face to Face time with patient: 14 minutes  30 minutes of total time spent on the encounter, which includes face to face time and non-face to face time preparing to see the patient (eg, review of tests), Obtaining and/or reviewing separately obtained history, Documenting clinical information in the electronic or other health record, Independently interpreting results (not separately reported) and communicating results to the patient/family/caregiver, or Care coordination (not separately reported).         Each patient to whom he or she provides medical services by telemedicine is:  (1) informed of the relationship between the physician and patient and the respective role of any other health care provider with respect to management of the patient; and (2) notified that he or she may decline to receive medical services by telemedicine and may withdraw from such care at any time.    Notes:   This 58-year-old woman underwent a liver transplant close to 15 years ago for cirrhosis complicated by hepatocellular cancer.  She has had a persistent mixed anicteric cholestatic pattern to her liver biochemistry.  However she is free of any symptoms of chronic liver disease.  Her liver synthetic function is normal.  She is maintained on tacrolimus 1 mg twice daily and Myfortic 360 mg twice daily.  She has been free of any clinical or radiological recurrence of hepatocellular cancer.  We will continue to monitor her allograft function and immunosuppression every 4 months.  She will return to clinic in 1 year's time.

## 2025-03-11 NOTE — LETTER
March 11, 2025        Deandre Morton Jr.  [address] [facility] [address]  Phone: [phone]  Fax: [phone]             [facility] 1st Fl  [address]  [facility] [address]  Phone: [phone]   Patient: Ngoc Cruz   MR Number: [id number]   YOB: 1965   Date of Visit: 3/11/2025       Dear Dr. Deandre Morton Jr.    Thank you for referring Ngoc Cruz to me for evaluation. Attached you will find relevant portions of my assessment and plan of care.    If you have questions, please do not hesitate to call me. I look forward to following Ngoc Cruz along with you.    Sincerely,    Tay Claros MD    Enclosure    If you would like to receive this communication electronically, please contact [email or web address] or [phone] to request [employer] Link access.    [employer] is a tool which provides read-only access to select patient information with whom you have a relationship. Its easy to use and provides real time access to review your patients record including encounter summaries, notes, results, and demographic information.    If you feel you have received this communication in error or would no longer like to receive these types of communications, please e-mail [email or web address]

## 2025-03-21 ENCOUNTER — TELEPHONE (OUTPATIENT)
Dept: TRANSPLANT | Facility: CLINIC | Age: 60
End: 2025-03-21
Payer: COMMERCIAL

## 2025-03-21 NOTE — TELEPHONE ENCOUNTER
Received update from patient    ----- Message from Stephania sent at 3/21/2025 10:00 AM CDT -----  Regarding: Nurse Sood  Contact: Patient can be contacted @# [phone]  PT called she is sorry she is late, didn't realize she was late. She is sched for labs on 4/3Patient can be contacted @# [phone]

## 2025-04-07 LAB
EXT AFP: 6.9
EXT ALBUMIN: 4
EXT ALKALINE PHOSPHATASE: 413
EXT ALT: 74
EXT AST: 58
EXT BASOPHIL%: 1.1
EXT BILIRUBIN TOTAL: 0.5
EXT BUN: 23.81
EXT CALCIUM: 9.39
EXT CHLORIDE: 109
EXT CO2: 25
EXT CREATININE: 1.7 MG/DL
EXT EOSINOPHIL%: 1.9
EXT GFR MDRD NON AF AMER: 31
EXT GLUCOSE: 82
EXT HEMATOCRIT: 33.5
EXT HEMOGLOBIN: 10.4
EXT LYMPH%: 4.9
EXT MONOCYTES%: 10.6
EXT PLATELETS: 313
EXT POTASSIUM: 4.07
EXT PROTEIN TOTAL: 7.4
EXT SEGS%: 81.2
EXT SODIUM: 143 MMOL/L
EXT TACROLIMUS LVL: 3.11
EXT WBC: 3.67

## 2025-04-08 ENCOUNTER — RESULTS FOLLOW-UP (OUTPATIENT)
Dept: TRANSPLANT | Facility: CLINIC | Age: 60
End: 2025-04-08
Payer: COMMERCIAL

## 2025-04-08 NOTE — TELEPHONE ENCOUNTER
Continue routine labs no changes needed.  Letter sent for next lab appointment 8/04/25      ----- Message from Tay Claros MD sent at 4/8/2025  9:09 AM CDT -----  Results reviewed. No action.  ----- Message -----  From: Indigo Pastor RN  Sent: 4/7/2025  10:52 AM CDT  To: Tay Claros MD

## 2025-09-04 ENCOUNTER — TELEPHONE (OUTPATIENT)
Dept: TRANSPLANT | Facility: CLINIC | Age: 60
End: 2025-09-04
Payer: COMMERCIAL